# Patient Record
Sex: FEMALE | Race: WHITE | NOT HISPANIC OR LATINO | Employment: OTHER | URBAN - METROPOLITAN AREA
[De-identification: names, ages, dates, MRNs, and addresses within clinical notes are randomized per-mention and may not be internally consistent; named-entity substitution may affect disease eponyms.]

---

## 2024-05-02 ENCOUNTER — APPOINTMENT (OUTPATIENT)
Dept: CARDIOLOGY | Facility: HOSPITAL | Age: 84
End: 2024-05-02
Payer: MEDICARE

## 2024-05-02 ENCOUNTER — APPOINTMENT (OUTPATIENT)
Dept: RADIOLOGY | Facility: HOSPITAL | Age: 84
End: 2024-05-02
Payer: MEDICARE

## 2024-05-02 ENCOUNTER — HOSPITAL ENCOUNTER (OUTPATIENT)
Facility: HOSPITAL | Age: 84
Setting detail: OBSERVATION
Discharge: HOME | End: 2024-05-03
Attending: STUDENT IN AN ORGANIZED HEALTH CARE EDUCATION/TRAINING PROGRAM | Admitting: INTERNAL MEDICINE
Payer: MEDICARE

## 2024-05-02 DIAGNOSIS — R52 PAIN, UNSPECIFIED: ICD-10-CM

## 2024-05-02 DIAGNOSIS — M54.2 CERVICALGIA: ICD-10-CM

## 2024-05-02 DIAGNOSIS — M70.62 TROCHANTERIC BURSITIS, LEFT HIP: ICD-10-CM

## 2024-05-02 DIAGNOSIS — Z96.659 HISTORY OF KNEE JOINT REPLACEMENT: ICD-10-CM

## 2024-05-02 DIAGNOSIS — J45.30 MILD PERSISTENT ASTHMA WITHOUT COMPLICATION (HHS-HCC): ICD-10-CM

## 2024-05-02 DIAGNOSIS — E78.00 PURE HYPERCHOLESTEROLEMIA: ICD-10-CM

## 2024-05-02 DIAGNOSIS — I25.10 ATHEROSCLEROSIS OF NATIVE CORONARY ARTERY OF NATIVE HEART WITHOUT ANGINA PECTORIS: ICD-10-CM

## 2024-05-02 DIAGNOSIS — R23.4 CHANGES IN SKIN TEXTURE: ICD-10-CM

## 2024-05-02 DIAGNOSIS — M94.0 COSTOCHONDRITIS: ICD-10-CM

## 2024-05-02 DIAGNOSIS — N60.11 BILATERAL FIBROCYSTIC BREAST CHANGES: ICD-10-CM

## 2024-05-02 DIAGNOSIS — M76.32 ILIOTIBIAL BAND SYNDROME, LEFT LEG: ICD-10-CM

## 2024-05-02 DIAGNOSIS — M25.552 PAIN IN LEFT HIP: ICD-10-CM

## 2024-05-02 DIAGNOSIS — J45.40 MODERATE PERSISTENT ASTHMA WITHOUT COMPLICATION (HHS-HCC): ICD-10-CM

## 2024-05-02 DIAGNOSIS — M20.5X1 CROSSOVER TOE DEFORMITY OF RIGHT FOOT: ICD-10-CM

## 2024-05-02 DIAGNOSIS — R29.898 WEAKNESS OF BOTH LOWER EXTREMITIES: ICD-10-CM

## 2024-05-02 DIAGNOSIS — I47.10 PSVT (PAROXYSMAL SUPRAVENTRICULAR TACHYCARDIA) (CMS-HCC): ICD-10-CM

## 2024-05-02 DIAGNOSIS — M25.562 CHRONIC PAIN OF LEFT KNEE: ICD-10-CM

## 2024-05-02 DIAGNOSIS — M16.12 PRIMARY OSTEOARTHRITIS OF LEFT HIP: ICD-10-CM

## 2024-05-02 DIAGNOSIS — M20.41 HAMMERTOE OF RIGHT FOOT: ICD-10-CM

## 2024-05-02 DIAGNOSIS — F06.4 ANXIETY DISORDER DUE TO GENERAL MEDICAL CONDITION: ICD-10-CM

## 2024-05-02 DIAGNOSIS — G25.0 BENIGN ESSENTIAL TREMOR: ICD-10-CM

## 2024-05-02 DIAGNOSIS — M20.40 ACQUIRED HAMMER TOE: ICD-10-CM

## 2024-05-02 DIAGNOSIS — M19.079 1ST MTP ARTHRITIS: ICD-10-CM

## 2024-05-02 DIAGNOSIS — I21.4: ICD-10-CM

## 2024-05-02 DIAGNOSIS — R07.9 CHEST PAIN OF UNKNOWN ETIOLOGY: ICD-10-CM

## 2024-05-02 DIAGNOSIS — I77.1 STENOSIS OF LEFT SUBCLAVIAN ARTERY (CMS-HCC): Chronic | ICD-10-CM

## 2024-05-02 DIAGNOSIS — M85.80 OTHER SPECIFIED DISORDERS OF BONE DENSITY AND STRUCTURE, UNSPECIFIED SITE: ICD-10-CM

## 2024-05-02 DIAGNOSIS — M47.816 LUMBAR SPONDYLOSIS: ICD-10-CM

## 2024-05-02 DIAGNOSIS — Z86.718 HISTORY OF DVT OF LOWER EXTREMITY: ICD-10-CM

## 2024-05-02 DIAGNOSIS — N60.12 BILATERAL FIBROCYSTIC BREAST CHANGES: ICD-10-CM

## 2024-05-02 DIAGNOSIS — M25.511 CHRONIC RIGHT SHOULDER PAIN: ICD-10-CM

## 2024-05-02 DIAGNOSIS — I10 HYPERTENSION, UNSPECIFIED TYPE: ICD-10-CM

## 2024-05-02 DIAGNOSIS — E04.1 THYROID NODULE: ICD-10-CM

## 2024-05-02 DIAGNOSIS — B39.4 HISTOPLASMA CAPSULATUM INFECTION: ICD-10-CM

## 2024-05-02 DIAGNOSIS — G45.3 AMAUROSIS FUGAX: ICD-10-CM

## 2024-05-02 DIAGNOSIS — R07.9 ACUTE CHEST PAIN: Primary | ICD-10-CM

## 2024-05-02 DIAGNOSIS — M76.12 PSOAS TENDINITIS, LEFT HIP: ICD-10-CM

## 2024-05-02 DIAGNOSIS — G89.29 CHRONIC PAIN OF LEFT KNEE: ICD-10-CM

## 2024-05-02 DIAGNOSIS — I10 ESSENTIAL HYPERTENSION: ICD-10-CM

## 2024-05-02 DIAGNOSIS — I25.2 OLD MYOCARDIAL INFARCTION: ICD-10-CM

## 2024-05-02 DIAGNOSIS — M51.34 DEGENERATION OF THORACIC INTERVERTEBRAL DISC: ICD-10-CM

## 2024-05-02 DIAGNOSIS — M62.81 GENERALIZED MUSCLE WEAKNESS: ICD-10-CM

## 2024-05-02 DIAGNOSIS — I73.9 PERIPHERAL VASCULAR DISEASE (CMS-HCC): ICD-10-CM

## 2024-05-02 DIAGNOSIS — R07.1 CHEST PAIN ON BREATHING: ICD-10-CM

## 2024-05-02 DIAGNOSIS — M20.61 ACQUIRED DEFORMITY OF RIGHT TOE: ICD-10-CM

## 2024-05-02 DIAGNOSIS — R59.0 REACTIVE CERVICAL LYMPHADENOPATHY: ICD-10-CM

## 2024-05-02 DIAGNOSIS — R73.03 PREDIABETES: ICD-10-CM

## 2024-05-02 DIAGNOSIS — M20.21 HALLUX RIGIDUS, RIGHT FOOT: ICD-10-CM

## 2024-05-02 DIAGNOSIS — M12.811 ROTATOR CUFF ARTHROPATHY OF RIGHT SHOULDER: ICD-10-CM

## 2024-05-02 DIAGNOSIS — M25.561 PAIN, JOINT, KNEE, RIGHT: ICD-10-CM

## 2024-05-02 DIAGNOSIS — Z86.718 PERSONAL HISTORY OF OTHER VENOUS THROMBOSIS AND EMBOLISM: ICD-10-CM

## 2024-05-02 DIAGNOSIS — G89.29 CHRONIC RIGHT SHOULDER PAIN: ICD-10-CM

## 2024-05-02 DIAGNOSIS — R79.89 LFTS ABNORMAL: ICD-10-CM

## 2024-05-02 DIAGNOSIS — H11.32 SUBCONJUNCTIVAL HEMORRHAGE OF LEFT EYE: ICD-10-CM

## 2024-05-02 DIAGNOSIS — Q66.89 CLAW TOE: ICD-10-CM

## 2024-05-02 DIAGNOSIS — Z96.1 PSEUDOPHAKIA OF BOTH EYES: ICD-10-CM

## 2024-05-02 DIAGNOSIS — R91.8 LUNG NODULES: ICD-10-CM

## 2024-05-02 PROBLEM — K21.9 GASTROESOPHAGEAL REFLUX DISEASE: Status: ACTIVE | Noted: 2018-10-19

## 2024-05-02 PROBLEM — S52.136D CLOSED NONDISPLACED FRACTURE OF NECK OF RADIUS WITH ROUTINE HEALING: Status: ACTIVE | Noted: 2021-10-19

## 2024-05-02 PROBLEM — J45.909 ASTHMA (HHS-HCC): Status: ACTIVE | Noted: 2024-05-02

## 2024-05-02 PROBLEM — N60.19 DIFFUSE CYSTIC MASTOPATHY: Status: ACTIVE | Noted: 2024-05-02

## 2024-05-02 PROBLEM — S52.126D CLOSED NONDISPLACED FRACTURE OF HEAD OF RADIUS WITH ROUTINE HEALING: Status: ACTIVE | Noted: 2021-09-08

## 2024-05-02 PROBLEM — E78.5 HYPERLIPIDEMIA: Status: ACTIVE | Noted: 2019-04-29

## 2024-05-02 PROBLEM — J30.9 ALLERGIC RHINITIS: Status: ACTIVE | Noted: 2019-04-29

## 2024-05-02 LAB
ALBUMIN SERPL BCP-MCNC: 3.7 G/DL (ref 3.4–5)
ALP SERPL-CCNC: 49 U/L (ref 33–136)
ALT SERPL W P-5'-P-CCNC: 11 U/L (ref 7–45)
ANION GAP SERPL CALC-SCNC: 11 MMOL/L (ref 10–20)
AORTIC VALVE MEAN GRADIENT: 5 MMHG
AORTIC VALVE PEAK VELOCITY: 1.46 M/S
AST SERPL W P-5'-P-CCNC: 14 U/L (ref 9–39)
AV PEAK GRADIENT: 8.5 MMHG
AVA (PEAK VEL): 1.94 CM2
AVA (VTI): 2.07 CM2
BASOPHILS # BLD AUTO: 0.06 X10*3/UL (ref 0–0.1)
BASOPHILS NFR BLD AUTO: 0.9 %
BILIRUB SERPL-MCNC: 0.5 MG/DL (ref 0–1.2)
BNP SERPL-MCNC: 35 PG/ML (ref 0–99)
BUN SERPL-MCNC: 19 MG/DL (ref 6–23)
CALCIUM SERPL-MCNC: 9.2 MG/DL (ref 8.6–10.3)
CARDIAC TROPONIN I PNL SERPL HS: 3 NG/L (ref 0–13)
CARDIAC TROPONIN I PNL SERPL HS: <3 NG/L (ref 0–13)
CHLORIDE SERPL-SCNC: 108 MMOL/L (ref 98–107)
CO2 SERPL-SCNC: 25 MMOL/L (ref 21–32)
CREAT SERPL-MCNC: 0.64 MG/DL (ref 0.5–1.05)
EGFRCR SERPLBLD CKD-EPI 2021: 88 ML/MIN/1.73M*2
EJECTION FRACTION APICAL 4 CHAMBER: 56.5
EOSINOPHIL # BLD AUTO: 0.23 X10*3/UL (ref 0–0.4)
EOSINOPHIL NFR BLD AUTO: 3.5 %
ERYTHROCYTE [DISTWIDTH] IN BLOOD BY AUTOMATED COUNT: 14.1 % (ref 11.5–14.5)
GLOBAL LONGITUDINAL STRAIN: 14.9 %
GLUCOSE SERPL-MCNC: 111 MG/DL (ref 74–99)
HCT VFR BLD AUTO: 37.4 % (ref 36–46)
HGB BLD-MCNC: 12.1 G/DL (ref 12–16)
IMM GRANULOCYTES # BLD AUTO: 0.01 X10*3/UL (ref 0–0.5)
IMM GRANULOCYTES NFR BLD AUTO: 0.2 % (ref 0–0.9)
LEFT ATRIUM VOLUME AREA LENGTH INDEX BSA: 23.4 ML/M2
LEFT VENTRICLE INTERNAL DIMENSION DIASTOLE: 4 CM (ref 3.5–6)
LEFT VENTRICULAR OUTFLOW TRACT DIAMETER: 2 CM
LIPASE SERPL-CCNC: 32 U/L (ref 9–82)
LV EJECTION FRACTION BIPLANE: 62 %
LYMPHOCYTES # BLD AUTO: 2.62 X10*3/UL (ref 0.8–3)
LYMPHOCYTES NFR BLD AUTO: 39.5 %
MAGNESIUM SERPL-MCNC: 1.69 MG/DL (ref 1.6–2.4)
MCH RBC QN AUTO: 30.9 PG (ref 26–34)
MCHC RBC AUTO-ENTMCNC: 32.4 G/DL (ref 32–36)
MCV RBC AUTO: 96 FL (ref 80–100)
MITRAL VALVE E/A RATIO: 0.8
MITRAL VALVE E/E' RATIO: 12.47
MONOCYTES # BLD AUTO: 0.49 X10*3/UL (ref 0.05–0.8)
MONOCYTES NFR BLD AUTO: 7.4 %
NEUTROPHILS # BLD AUTO: 3.22 X10*3/UL (ref 1.6–5.5)
NEUTROPHILS NFR BLD AUTO: 48.5 %
NRBC BLD-RTO: 0 /100 WBCS (ref 0–0)
PLATELET # BLD AUTO: 202 X10*3/UL (ref 150–450)
POTASSIUM SERPL-SCNC: 4.1 MMOL/L (ref 3.5–5.3)
PROT SERPL-MCNC: 6.1 G/DL (ref 6.4–8.2)
RBC # BLD AUTO: 3.91 X10*6/UL (ref 4–5.2)
RIGHT VENTRICLE FREE WALL PEAK S': 12.2 CM/S
RIGHT VENTRICLE PEAK SYSTOLIC PRESSURE: 27.6 MMHG
SODIUM SERPL-SCNC: 140 MMOL/L (ref 136–145)
TRICUSPID ANNULAR PLANE SYSTOLIC EXCURSION: 2.1 CM
WBC # BLD AUTO: 6.6 X10*3/UL (ref 4.4–11.3)

## 2024-05-02 PROCEDURE — 93306 TTE W/DOPPLER COMPLETE: CPT | Performed by: INTERNAL MEDICINE

## 2024-05-02 PROCEDURE — 83735 ASSAY OF MAGNESIUM: CPT | Performed by: STUDENT IN AN ORGANIZED HEALTH CARE EDUCATION/TRAINING PROGRAM

## 2024-05-02 PROCEDURE — 99222 1ST HOSP IP/OBS MODERATE 55: CPT | Performed by: NURSE PRACTITIONER

## 2024-05-02 PROCEDURE — 83880 ASSAY OF NATRIURETIC PEPTIDE: CPT | Performed by: STUDENT IN AN ORGANIZED HEALTH CARE EDUCATION/TRAINING PROGRAM

## 2024-05-02 PROCEDURE — 93306 TTE W/DOPPLER COMPLETE: CPT

## 2024-05-02 PROCEDURE — 80053 COMPREHEN METABOLIC PANEL: CPT | Performed by: STUDENT IN AN ORGANIZED HEALTH CARE EDUCATION/TRAINING PROGRAM

## 2024-05-02 PROCEDURE — 71046 X-RAY EXAM CHEST 2 VIEWS: CPT | Performed by: RADIOLOGY

## 2024-05-02 PROCEDURE — 83690 ASSAY OF LIPASE: CPT | Performed by: STUDENT IN AN ORGANIZED HEALTH CARE EDUCATION/TRAINING PROGRAM

## 2024-05-02 PROCEDURE — 93005 ELECTROCARDIOGRAM TRACING: CPT

## 2024-05-02 PROCEDURE — 2500000004 HC RX 250 GENERAL PHARMACY W/ HCPCS (ALT 636 FOR OP/ED): Performed by: STUDENT IN AN ORGANIZED HEALTH CARE EDUCATION/TRAINING PROGRAM

## 2024-05-02 PROCEDURE — 2500000001 HC RX 250 WO HCPCS SELF ADMINISTERED DRUGS (ALT 637 FOR MEDICARE OP): Performed by: NURSE PRACTITIONER

## 2024-05-02 PROCEDURE — 84484 ASSAY OF TROPONIN QUANT: CPT | Performed by: STUDENT IN AN ORGANIZED HEALTH CARE EDUCATION/TRAINING PROGRAM

## 2024-05-02 PROCEDURE — 99285 EMERGENCY DEPT VISIT HI MDM: CPT | Mod: 25

## 2024-05-02 PROCEDURE — G0378 HOSPITAL OBSERVATION PER HR: HCPCS

## 2024-05-02 PROCEDURE — 2500000001 HC RX 250 WO HCPCS SELF ADMINISTERED DRUGS (ALT 637 FOR MEDICARE OP): Performed by: STUDENT IN AN ORGANIZED HEALTH CARE EDUCATION/TRAINING PROGRAM

## 2024-05-02 PROCEDURE — 36415 COLL VENOUS BLD VENIPUNCTURE: CPT | Performed by: STUDENT IN AN ORGANIZED HEALTH CARE EDUCATION/TRAINING PROGRAM

## 2024-05-02 PROCEDURE — 96374 THER/PROPH/DIAG INJ IV PUSH: CPT

## 2024-05-02 PROCEDURE — 99222 1ST HOSP IP/OBS MODERATE 55: CPT | Performed by: INTERNAL MEDICINE

## 2024-05-02 PROCEDURE — 71046 X-RAY EXAM CHEST 2 VIEWS: CPT

## 2024-05-02 PROCEDURE — 85025 COMPLETE CBC W/AUTO DIFF WBC: CPT | Performed by: STUDENT IN AN ORGANIZED HEALTH CARE EDUCATION/TRAINING PROGRAM

## 2024-05-02 RX ORDER — LOSARTAN POTASSIUM 25 MG/1
25 TABLET ORAL
COMMUNITY
Start: 2024-02-20

## 2024-05-02 RX ORDER — MOMETASONE FUROATE 100 UG/1
1 AEROSOL RESPIRATORY (INHALATION)
COMMUNITY
Start: 2023-05-25

## 2024-05-02 RX ORDER — ALUMINUM HYDROXIDE, MAGNESIUM HYDROXIDE, AND SIMETHICONE 1200; 120; 1200 MG/30ML; MG/30ML; MG/30ML
30 SUSPENSION ORAL ONCE
Status: COMPLETED | OUTPATIENT
Start: 2024-05-02 | End: 2024-05-02

## 2024-05-02 RX ORDER — TRIAMCINOLONE ACETONIDE 1 MG/G
OINTMENT TOPICAL
COMMUNITY
Start: 2022-07-29

## 2024-05-02 RX ORDER — KETOCONAZOLE 20 MG/G
1 CREAM TOPICAL 2 TIMES DAILY
Status: ON HOLD | COMMUNITY
Start: 2023-10-04 | End: 2024-05-02

## 2024-05-02 RX ORDER — ATORVASTATIN CALCIUM 20 MG/1
20 TABLET, FILM COATED ORAL
Status: DISCONTINUED | OUTPATIENT
Start: 2024-05-02 | End: 2024-05-03 | Stop reason: HOSPADM

## 2024-05-02 RX ORDER — ACETAMINOPHEN 325 MG/1
650 TABLET ORAL EVERY 4 HOURS PRN
Status: DISCONTINUED | OUTPATIENT
Start: 2024-05-02 | End: 2024-05-03 | Stop reason: HOSPADM

## 2024-05-02 RX ORDER — FERROUS SULFATE, DRIED 160(50) MG
1 TABLET, EXTENDED RELEASE ORAL DAILY
Status: DISCONTINUED | OUTPATIENT
Start: 2024-05-02 | End: 2024-05-03 | Stop reason: HOSPADM

## 2024-05-02 RX ORDER — ACETAMINOPHEN 650 MG/1
650 SUPPOSITORY RECTAL EVERY 4 HOURS PRN
Status: DISCONTINUED | OUTPATIENT
Start: 2024-05-02 | End: 2024-05-03 | Stop reason: HOSPADM

## 2024-05-02 RX ORDER — REGADENOSON 0.08 MG/ML
0.4 INJECTION, SOLUTION INTRAVENOUS
Status: CANCELLED | OUTPATIENT
Start: 2024-05-02

## 2024-05-02 RX ORDER — MONTELUKAST SODIUM 10 MG/1
10 TABLET ORAL NIGHTLY
Status: DISCONTINUED | OUTPATIENT
Start: 2024-05-02 | End: 2024-05-03 | Stop reason: HOSPADM

## 2024-05-02 RX ORDER — MORPHINE SULFATE 2 MG/ML
2 INJECTION, SOLUTION INTRAMUSCULAR; INTRAVENOUS EVERY 5 MIN PRN
Status: DISCONTINUED | OUTPATIENT
Start: 2024-05-02 | End: 2024-05-03 | Stop reason: HOSPADM

## 2024-05-02 RX ORDER — LIDOCAINE HYDROCHLORIDE 20 MG/ML
15 SOLUTION OROPHARYNGEAL ONCE
Status: COMPLETED | OUTPATIENT
Start: 2024-05-02 | End: 2024-05-02

## 2024-05-02 RX ORDER — FAMOTIDINE 20 MG/1
20 TABLET, FILM COATED ORAL 2 TIMES DAILY
Status: DISCONTINUED | OUTPATIENT
Start: 2024-05-02 | End: 2024-05-03 | Stop reason: HOSPADM

## 2024-05-02 RX ORDER — LANOLIN ALCOHOL/MO/W.PET/CERES
1 CREAM (GRAM) TOPICAL DAILY
COMMUNITY

## 2024-05-02 RX ORDER — POLYETHYLENE GLYCOL 3350 17 G/17G
17 POWDER, FOR SOLUTION ORAL DAILY
Status: DISCONTINUED | OUTPATIENT
Start: 2024-05-02 | End: 2024-05-03 | Stop reason: HOSPADM

## 2024-05-02 RX ORDER — CHOLECALCIFEROL (VITAMIN D3) 25 MCG
TABLET ORAL
COMMUNITY

## 2024-05-02 RX ORDER — AMINOPHYLLINE 25 MG/ML
125 INJECTION, SOLUTION INTRAVENOUS AS NEEDED
Status: CANCELLED | OUTPATIENT
Start: 2024-05-02

## 2024-05-02 RX ORDER — MONTELUKAST SODIUM 10 MG/1
1 TABLET ORAL NIGHTLY
COMMUNITY
Start: 2023-12-06

## 2024-05-02 RX ORDER — HYDROCORTISONE 25 MG/G
CREAM TOPICAL
COMMUNITY
Start: 2023-10-04

## 2024-05-02 RX ORDER — METOPROLOL SUCCINATE 25 MG/1
1 TABLET, EXTENDED RELEASE ORAL DAILY
Status: ON HOLD | COMMUNITY
End: 2024-05-02

## 2024-05-02 RX ORDER — ASPIRIN 81 MG/1
81 TABLET ORAL DAILY
Status: DISCONTINUED | OUTPATIENT
Start: 2024-05-03 | End: 2024-05-03 | Stop reason: HOSPADM

## 2024-05-02 RX ORDER — NITROGLYCERIN 0.4 MG/1
0.4 TABLET SUBLINGUAL EVERY 5 MIN PRN
Status: DISCONTINUED | OUTPATIENT
Start: 2024-05-02 | End: 2024-05-03 | Stop reason: HOSPADM

## 2024-05-02 RX ORDER — ZOLPIDEM TARTRATE 10 MG/1
5 TABLET ORAL DAILY PRN
COMMUNITY
Start: 2022-09-13

## 2024-05-02 RX ORDER — LOSARTAN POTASSIUM 50 MG/1
50 TABLET ORAL DAILY
Status: DISCONTINUED | OUTPATIENT
Start: 2024-05-02 | End: 2024-05-03 | Stop reason: HOSPADM

## 2024-05-02 RX ORDER — CHOLECALCIFEROL (VITAMIN D3) 125 MCG
CAPSULE ORAL
COMMUNITY

## 2024-05-02 RX ORDER — ACETAMINOPHEN AND PHENYLEPHRINE HCL 325; 5 MG/1; MG/1
TABLET ORAL
Status: ON HOLD | COMMUNITY
End: 2024-05-02 | Stop reason: WASHOUT

## 2024-05-02 RX ORDER — ATORVASTATIN CALCIUM 20 MG/1
20 TABLET, FILM COATED ORAL
COMMUNITY
Start: 2023-10-12

## 2024-05-02 RX ORDER — FENTANYL CITRATE 50 UG/ML
50 INJECTION, SOLUTION INTRAMUSCULAR; INTRAVENOUS ONCE
Status: COMPLETED | OUTPATIENT
Start: 2024-05-02 | End: 2024-05-02

## 2024-05-02 RX ORDER — ACETAMINOPHEN 500 MG
1 TABLET ORAL DAILY
COMMUNITY

## 2024-05-02 RX ORDER — ASPIRIN 325 MG
1 TABLET ORAL DAILY
Status: ON HOLD | COMMUNITY
End: 2024-05-02 | Stop reason: WASHOUT

## 2024-05-02 RX ORDER — ACETAMINOPHEN 160 MG/5ML
650 SOLUTION ORAL EVERY 4 HOURS PRN
Status: DISCONTINUED | OUTPATIENT
Start: 2024-05-02 | End: 2024-05-03 | Stop reason: HOSPADM

## 2024-05-02 RX ORDER — ALBUTEROL SULFATE 0.83 MG/ML
2.5 SOLUTION RESPIRATORY (INHALATION) EVERY 6 HOURS PRN
Status: DISCONTINUED | OUTPATIENT
Start: 2024-05-02 | End: 2024-05-03 | Stop reason: HOSPADM

## 2024-05-02 RX ADMIN — FAMOTIDINE 20 MG: 20 TABLET ORAL at 22:10

## 2024-05-02 RX ADMIN — LIDOCAINE HYDROCHLORIDE 15 ML: 20 SOLUTION ORAL at 09:00

## 2024-05-02 RX ADMIN — FENTANYL CITRATE 50 MCG: 50 INJECTION INTRAMUSCULAR; INTRAVENOUS at 10:34

## 2024-05-02 RX ADMIN — APIXABAN 2.5 MG: 2.5 TABLET, FILM COATED ORAL at 22:10

## 2024-05-02 RX ADMIN — MONTELUKAST 10 MG: 10 TABLET, FILM COATED ORAL at 22:10

## 2024-05-02 RX ADMIN — ALUMINUM HYDROXIDE, MAGNESIUM HYDROXIDE, AND DIMETHICONE 30 ML: 200; 20; 200 SUSPENSION ORAL at 09:00

## 2024-05-02 RX ADMIN — ACETAMINOPHEN 650 MG: 325 TABLET ORAL at 15:48

## 2024-05-02 RX ADMIN — LOSARTAN POTASSIUM 50 MG: 50 TABLET, FILM COATED ORAL at 14:41

## 2024-05-02 RX ADMIN — Medication 1 TABLET: at 22:10

## 2024-05-02 RX ADMIN — ATORVASTATIN CALCIUM 20 MG: 20 TABLET, FILM COATED ORAL at 22:10

## 2024-05-02 SDOH — SOCIAL STABILITY: SOCIAL INSECURITY: HAVE YOU HAD THOUGHTS OF HARMING ANYONE ELSE?: NO

## 2024-05-02 SDOH — SOCIAL STABILITY: SOCIAL INSECURITY: WERE YOU ABLE TO COMPLETE ALL THE BEHAVIORAL HEALTH SCREENINGS?: YES

## 2024-05-02 ASSESSMENT — COGNITIVE AND FUNCTIONAL STATUS - GENERAL
PATIENT BASELINE BEDBOUND: NO
DAILY ACTIVITIY SCORE: 24
MOBILITY SCORE: 24
MOBILITY SCORE: 24
DAILY ACTIVITIY SCORE: 24

## 2024-05-02 ASSESSMENT — LIFESTYLE VARIABLES
SKIP TO QUESTIONS 9-10: 1
AUDIT TOTAL SCORE: 3
HOW OFTEN DURING THE LAST YEAR HAVE YOU BEEN UNABLE TO REMEMBER WHAT HAPPENED THE NIGHT BEFORE BECAUSE YOU HAD BEEN DRINKING: NEVER
AUDIT TOTAL SCORE: 0
HAVE PEOPLE ANNOYED YOU BY CRITICIZING YOUR DRINKING: NO
HOW OFTEN DURING THE LAST YEAR HAVE YOU FOUND THAT YOU WERE NOT ABLE TO STOP DRINKING ONCE YOU HAD STARTED: NEVER
SUBSTANCE_ABUSE_PAST_12_MONTHS: NO
HOW OFTEN DO YOU HAVE A DRINK CONTAINING ALCOHOL: 2-3 TIMES A WEEK
EVER FELT BAD OR GUILTY ABOUT YOUR DRINKING: NO
HOW OFTEN DURING THE LAST YEAR HAVE YOU NEEDED AN ALCOHOLIC DRINK FIRST THING IN THE MORNING TO GET YOURSELF GOING AFTER A NIGHT OF HEAVY DRINKING: NEVER
HAVE YOU EVER FELT YOU SHOULD CUT DOWN ON YOUR DRINKING: NO
EVER HAD A DRINK FIRST THING IN THE MORNING TO STEADY YOUR NERVES TO GET RID OF A HANGOVER: NO
TOTAL SCORE: 0
HOW OFTEN DURING THE LAST YEAR HAVE YOU HAD A FEELING OF GUILT OR REMORSE AFTER DRINKING: NEVER
AUDIT-C TOTAL SCORE: 3
HOW OFTEN DO YOU HAVE 6 OR MORE DRINKS ON ONE OCCASION: NEVER
HOW OFTEN DURING THE LAST YEAR HAVE YOU FAILED TO DO WHAT WAS NORMALLY EXPECTED FROM YOU BECAUSE OF DRINKING: NEVER
PRESCIPTION_ABUSE_PAST_12_MONTHS: NO
HOW MANY STANDARD DRINKS CONTAINING ALCOHOL DO YOU HAVE ON A TYPICAL DAY: 1 OR 2
HAS A RELATIVE, FRIEND, DOCTOR, OR ANOTHER HEALTH PROFESSIONAL EXPRESSED CONCERN ABOUT YOUR DRINKING OR SUGGESTED YOU CUT DOWN: NO
HAVE YOU OR SOMEONE ELSE BEEN INJURED AS A RESULT OF YOUR DRINKING: NO
AUDIT-C TOTAL SCORE: 3

## 2024-05-02 ASSESSMENT — ACTIVITIES OF DAILY LIVING (ADL)
HEARING - RIGHT EAR: FUNCTIONAL
TOILETING: INDEPENDENT
HEARING - RIGHT EAR: FUNCTIONAL
ADEQUATE_TO_COMPLETE_ADL: YES
FEEDING YOURSELF: INDEPENDENT
TOILETING: INDEPENDENT
GROOMING: INDEPENDENT
BATHING: INDEPENDENT
BATHING: INDEPENDENT
JUDGMENT_ADEQUATE_SAFELY_COMPLETE_DAILY_ACTIVITIES: YES
PATIENT'S MEMORY ADEQUATE TO SAFELY COMPLETE DAILY ACTIVITIES?: YES
WALKS IN HOME: INDEPENDENT
DRESSING YOURSELF: INDEPENDENT
JUDGMENT_ADEQUATE_SAFELY_COMPLETE_DAILY_ACTIVITIES: YES
GROOMING: INDEPENDENT
WALKS IN HOME: INDEPENDENT
LACK_OF_TRANSPORTATION: NO
PATIENT'S MEMORY ADEQUATE TO SAFELY COMPLETE DAILY ACTIVITIES?: YES
HEARING - LEFT EAR: FUNCTIONAL
HEARING - LEFT EAR: FUNCTIONAL
FEEDING YOURSELF: INDEPENDENT
ADEQUATE_TO_COMPLETE_ADL: YES
DRESSING YOURSELF: INDEPENDENT

## 2024-05-02 ASSESSMENT — ENCOUNTER SYMPTOMS
SLEEP DISTURBANCE: 0
ADENOPATHY: 0
HEADACHES: 0
UNEXPECTED WEIGHT CHANGE: 0
HEMATURIA: 0
ABDOMINAL PAIN: 0
EYE DISCHARGE: 0
FEVER: 0
FREQUENCY: 0
VOMITING: 0
CHOKING: 0
NUMBNESS: 0
HALLUCINATIONS: 0
CONFUSION: 0
ARTHRALGIAS: 0
NERVOUS/ANXIOUS: 0
NECK STIFFNESS: 0
BLOOD IN STOOL: 0
NAUSEA: 0
SINUS PAIN: 0
COLOR CHANGE: 0
CONSTIPATION: 0
DYSURIA: 0
VOICE CHANGE: 0
TREMORS: 0
CHILLS: 0
DIFFICULTY URINATING: 0
TROUBLE SWALLOWING: 0
EYE ITCHING: 0
POLYDIPSIA: 0
POLYPHAGIA: 0
WHEEZING: 1
SHORTNESS OF BREATH: 0
APPETITE CHANGE: 0
MYALGIAS: 0
BACK PAIN: 0
DIZZINESS: 0
SPEECH DIFFICULTY: 0
APNEA: 0
WOUND: 0
SEIZURES: 0
BRUISES/BLEEDS EASILY: 0
PALPITATIONS: 0
DIARRHEA: 0
DYSPHORIC MOOD: 0
ABDOMINAL DISTENTION: 0
SORE THROAT: 0
FATIGUE: 0
COUGH: 0
LIGHT-HEADEDNESS: 0
CHEST TIGHTNESS: 0
NECK PAIN: 0
WEAKNESS: 0
FLANK PAIN: 0
EYE PAIN: 0
PHOTOPHOBIA: 0

## 2024-05-02 ASSESSMENT — COLUMBIA-SUICIDE SEVERITY RATING SCALE - C-SSRS
1. IN THE PAST MONTH, HAVE YOU WISHED YOU WERE DEAD OR WISHED YOU COULD GO TO SLEEP AND NOT WAKE UP?: NO
2. HAVE YOU ACTUALLY HAD ANY THOUGHTS OF KILLING YOURSELF?: NO
6. HAVE YOU EVER DONE ANYTHING, STARTED TO DO ANYTHING, OR PREPARED TO DO ANYTHING TO END YOUR LIFE?: NO

## 2024-05-02 ASSESSMENT — PAIN SCALES - GENERAL
PAINLEVEL_OUTOF10: 3
PAINLEVEL_OUTOF10: 1
PAINLEVEL_OUTOF10: 6
PAINLEVEL_OUTOF10: 1
PAINLEVEL_OUTOF10: 6

## 2024-05-02 ASSESSMENT — PAIN - FUNCTIONAL ASSESSMENT
PAIN_FUNCTIONAL_ASSESSMENT: 0-10

## 2024-05-02 ASSESSMENT — PATIENT HEALTH QUESTIONNAIRE - PHQ9
SUM OF ALL RESPONSES TO PHQ9 QUESTIONS 1 & 2: 0
1. LITTLE INTEREST OR PLEASURE IN DOING THINGS: NOT AT ALL
2. FEELING DOWN, DEPRESSED OR HOPELESS: NOT AT ALL

## 2024-05-02 ASSESSMENT — PAIN DESCRIPTION - DESCRIPTORS: DESCRIPTORS: ACHING

## 2024-05-02 ASSESSMENT — PAIN DESCRIPTION - LOCATION
LOCATION: HEAD
LOCATION: CHEST

## 2024-05-02 NOTE — H&P
History Obtained From: Patient and chart    History Of Present Illness:  Marychuy Anderson is a 83 y.o. female with PMHx s/f hypertension dyslipidemia multiple DVTs remote history of non-STEMI presenting with pain.  Patient had episode of midsternal chest discomfort after waking up as she did some push-ups she felt that the pain and discomfort improved.  The pain she has does not radiate it is in the center of the chest.  No associated shortness of breath no nausea or diaphoresis.  Today the patient woke up and had worse chest pain she tried her push-ups and the pain did not improve she took multiple aspirin and called EMS EMS initially gave her nitroglycerin which did improve the discomfort but bottomed out her blood pressure.  The patient was still having some chest discomfort on arrival to the emergency department was given 324 mg of aspirin 2 sets of troponins were negative.  EKG did not demonstrate any ischemic changes.  Patient was given fentanyl with improvement in her pain.  Patient denies fevers chills admits to some cough occasional wheezing relates a history of asthma for that.  She has not had any nausea belching vomiting diarrhea melena.  She does relate a history of GERD and has been in the hospital for chest discomfort in the emergency department she was given a GI cocktail that did not improve her symptoms.  Patient is admitted for further cardiac workup and management  ED Course:  ED Course as of 05/02/24 1301   Thu May 02, 2024   0743 EKG on my read shows sinus rhythm at a rate of 68 bpm no ST change elevation nonischemic EKG normal intervals. [CF]   0937 IMPRESSION:  No acute cardiopulmonary process   [CF]   1124 Upon repeat evaluation patient is no longer having chest pain. [CF]      ED Course User Index  [CF] Laine Jaimes MD         Diagnoses as of 05/02/24 1301   Acute chest pain     Relevant Results  Results for orders placed or performed during the hospital encounter of 05/02/24 (from  the past 24 hour(s))   ECG 12 lead   Result Value Ref Range    Ventricular Rate 68 BPM    Atrial Rate 66 BPM    KS Interval 187 ms    QRS Duration 92 ms    QT Interval 392 ms    QTC Calculation(Bazett) 417 ms    P Axis 45 degrees    R Axis 26 degrees    T Axis 15 degrees    QRS Count 11 beats    Q Onset 253 ms    T Offset 449 ms    QTC Fredericia 408 ms   CBC and Auto Differential   Result Value Ref Range    WBC 6.6 4.4 - 11.3 x10*3/uL    nRBC 0.0 0.0 - 0.0 /100 WBCs    RBC 3.91 (L) 4.00 - 5.20 x10*6/uL    Hemoglobin 12.1 12.0 - 16.0 g/dL    Hematocrit 37.4 36.0 - 46.0 %    MCV 96 80 - 100 fL    MCH 30.9 26.0 - 34.0 pg    MCHC 32.4 32.0 - 36.0 g/dL    RDW 14.1 11.5 - 14.5 %    Platelets 202 150 - 450 x10*3/uL    Neutrophils % 48.5 40.0 - 80.0 %    Immature Granulocytes %, Automated 0.2 0.0 - 0.9 %    Lymphocytes % 39.5 13.0 - 44.0 %    Monocytes % 7.4 2.0 - 10.0 %    Eosinophils % 3.5 0.0 - 6.0 %    Basophils % 0.9 0.0 - 2.0 %    Neutrophils Absolute 3.22 1.60 - 5.50 x10*3/uL    Immature Granulocytes Absolute, Automated 0.01 0.00 - 0.50 x10*3/uL    Lymphocytes Absolute 2.62 0.80 - 3.00 x10*3/uL    Monocytes Absolute 0.49 0.05 - 0.80 x10*3/uL    Eosinophils Absolute 0.23 0.00 - 0.40 x10*3/uL    Basophils Absolute 0.06 0.00 - 0.10 x10*3/uL   Magnesium   Result Value Ref Range    Magnesium 1.69 1.60 - 2.40 mg/dL   Comprehensive metabolic panel   Result Value Ref Range    Glucose 111 (H) 74 - 99 mg/dL    Sodium 140 136 - 145 mmol/L    Potassium 4.1 3.5 - 5.3 mmol/L    Chloride 108 (H) 98 - 107 mmol/L    Bicarbonate 25 21 - 32 mmol/L    Anion Gap 11 10 - 20 mmol/L    Urea Nitrogen 19 6 - 23 mg/dL    Creatinine 0.64 0.50 - 1.05 mg/dL    eGFR 88 >60 mL/min/1.73m*2    Calcium 9.2 8.6 - 10.3 mg/dL    Albumin 3.7 3.4 - 5.0 g/dL    Alkaline Phosphatase 49 33 - 136 U/L    Total Protein 6.1 (L) 6.4 - 8.2 g/dL    AST 14 9 - 39 U/L    Bilirubin, Total 0.5 0.0 - 1.2 mg/dL    ALT 11 7 - 45 U/L   Lipase   Result Value Ref Range     Lipase 32 9 - 82 U/L   B-Type Natriuretic Peptide   Result Value Ref Range    BNP 35 0 - 99 pg/mL   Troponin I, High Sensitivity, Initial   Result Value Ref Range    Troponin I, High Sensitivity 3 0 - 13 ng/L   Troponin, High Sensitivity, 1 Hour   Result Value Ref Range    Troponin I, High Sensitivity <3 0 - 13 ng/L      XR chest 2 views    Result Date: 2024  Interpreted By:  Audrey Moody, STUDY: XR CHEST 2 VIEWS;  2024 8:15 am   INDICATION: Signs/Symptoms:CP.   COMPARISON: 12/10/2019   ACCESSION NUMBER(S): [id number]   ORDERING CLINICIAN: ALEXSANDRA CALVERT   FINDINGS: CARDIOMEDIASTINAL SILHOUETTE: Cardiomediastinal silhouette is normal in size and configuration.     LUNGS: Lungs are clear.   ABDOMEN: No remarkable upper abdominal findings.     BONES: No acute osseous changes.       No acute cardiopulmonary process.   MACRO: None   Signed by: Audrey Moody 2024 8:36 AM Dictation workstation:   [id number]    ECG 12 lead    Result Date: 2024  Sinus rhythm Probable anteroseptal infarct, old    OCT MACULA CIRRUS OU (BOTH EYES)    Result Date: 4/10/2024  Date of Procedure 4/10/2024. Technician Information Imaging Technician: Domonique. Start time: 2:33 PM. Stop time: 2:36 PM. Interpretation Right Eye Normal foveal contour. Findings include RPE Irregularity. Left Eye Normal foveal contour. Findings include RPE Irregularity. Interval Change Right Eye Initial. Left Eye Initial.      Scheduled medications:    Continuous medications:    PRN medications:        Past Medical History  History of non-STEMI, mild coronary artery disease by cath , hypertension, PSVT, asthma multiple DVTs, hyperlipidemia GERD subclavian artery stenosis    Surgical History  Appendectomy arthroscopic knee surgery cholecystectomy IVC filter placement cataract surgery      Social History  She has no history on file for tobacco use, alcohol use, and drug use.    Family History  No family history on file.      Allergies  Adhesive, Adhesive tape-silicones, Carbomer, Cheese, Clindamycin, Crab, Doxycycline, House dust, Levofloxacin, Nitrofurantoin, Shellfish containing products, Ace inhibitors, Amoxicillin-pot clavulanate, Beta-blockers (beta-adrenergic blocking agts), Heparin, Heparin (bovine), Nickel, Nitrofurantoin macrocrystal, Sulfa (sulfonamide antibiotics), and Sulfamethoxazole-trimethoprim    Code Status  No Order     Review of Systems   Constitutional:  Negative for appetite change, chills, fatigue, fever and unexpected weight change.   HENT:  Negative for congestion, ear discharge, ear pain, mouth sores, nosebleeds, sinus pain, sore throat, trouble swallowing and voice change.    Eyes:  Negative for photophobia, pain, discharge, itching and visual disturbance.   Respiratory:  Positive for wheezing. Negative for apnea, cough, choking, chest tightness and shortness of breath.    Cardiovascular:  Positive for chest pain. Negative for palpitations and leg swelling.   Gastrointestinal:  Negative for abdominal distention, abdominal pain, blood in stool, constipation, diarrhea, nausea and vomiting.   Endocrine: Negative for cold intolerance, heat intolerance, polydipsia, polyphagia and polyuria.   Genitourinary:  Negative for decreased urine volume, difficulty urinating, dysuria, flank pain, frequency, hematuria and urgency.   Musculoskeletal:  Negative for arthralgias, back pain, gait problem, myalgias, neck pain and neck stiffness.   Skin:  Negative for color change, pallor and wound.   Allergic/Immunologic: Negative for food allergies and immunocompromised state.   Neurological:  Negative for dizziness, tremors, seizures, syncope, speech difficulty, weakness, light-headedness, numbness and headaches.   Hematological:  Negative for adenopathy. Does not bruise/bleed easily.   Psychiatric/Behavioral:  Negative for confusion, dysphoric mood, hallucinations, sleep disturbance and suicidal ideas. The patient is not  nervous/anxious.        Last Recorded Vitals  BP (!) 158/105   Pulse 67   Temp 36.4 °C (97.6 °F)   Resp 17   Wt 76.2 kg (168 lb)   SpO2 95%      Physical Exam  Vitals reviewed.   Constitutional:       General: She is not in acute distress.  HENT:      Head: Normocephalic and atraumatic.      Right Ear: External ear normal.      Left Ear: External ear normal.      Nose: Nose normal.      Mouth/Throat:      Mouth: Mucous membranes are moist.      Pharynx: Oropharynx is clear.   Eyes:      General: No scleral icterus.     Extraocular Movements: Extraocular movements intact.      Conjunctiva/sclera: Conjunctivae normal.      Pupils: Pupils are equal, round, and reactive to light.   Cardiovascular:      Rate and Rhythm: Normal rate and regular rhythm.      Pulses: Normal pulses.      Heart sounds: Normal heart sounds. No murmur heard.  Pulmonary:      Effort: Pulmonary effort is normal. No respiratory distress.      Breath sounds: Normal breath sounds. No wheezing, rhonchi or rales.   Chest:      Chest wall: No tenderness.   Abdominal:      General: Bowel sounds are normal. There is no distension.      Palpations: Abdomen is soft. There is no mass.      Tenderness: There is no abdominal tenderness. There is no rebound.   Musculoskeletal:         General: No swelling or deformity. Normal range of motion.      Cervical back: Normal range of motion.   Skin:     General: Skin is warm and dry.      Capillary Refill: Capillary refill takes less than 2 seconds.   Neurological:      General: No focal deficit present.      Mental Status: She is alert and oriented to person, place, and time.   Psychiatric:         Mood and Affect: Mood normal.         Behavior: Behavior normal.         Assessment/Plan   Principal Problem:    Chest pain on breathing  Active Problems:    Hyperlipidemia    Acid reflux    Essential hypertension    Peripheral vascular disease (CMS-HCC)    Asthma ([facility]-H[facility])    Acute chest pain  Initial evaluation in  the emergency department showed negative cardiac enzymes no EKG changes suggestive of ischemia  Patient reports prior history of non-STEMI but at that time her pain was radiating to the left arm and jaw, subsequent to that event she had cardiac catheterization showing mild coronary disease  She has not had any recent cardiac testing but did recently see her cardiologist earlier this month  Will place on floor with telemetry check an additional set of cardiac enzymes  Request echocardiogram and cardiology consultation  The patient is continued on her Eliquis and statin medication  will add asa previously on beta blocker but did not tolerate    Hypertension  The patient's blood pressure is running slightly elevated in the context of her discomfort  We will reconcile her home medications  Increase losartan  Patient has history of ACE inhibitor reaction with swelling of her lips    Acid reflux disease  H2 blocker    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  Service: Internal Medicine          Ric Haynes, APRN-CNP    Dragon dictation software was used to dictate this note and thus there may be minor errors in translation/transcription including garbled speech or misspellings. Please contact for clarification if needed.

## 2024-05-02 NOTE — ED TRIAGE NOTES
Pt to ed by ems for mid sternal cp that originally started yesterday morning, states pain went away throughout the day but was woken up again this morning with same pain. Cp was gone when EMS arrived but stated it came back en route, was given SL nitroglycerin but pt's b/p dropped to 90's systolic.

## 2024-05-02 NOTE — ED PROVIDER NOTES
HPI   No chief complaint on file.      Patient states that she woke up with chest pain.  She took 3 baby aspirin.  She called EMS since her pain not go away EMS gave her nitro which she states always helped her blood pressure and she felt that she was going to blackout when she took the nitro but she thinks it may have improved her pain.  Patient states that this happened yesterday morning as well and she did push-ups which she does every morning and they went away so she thought it was just her sleeping and awkward position.  After her push-ups today they did not go away so she called an ambulance.  She has had multiple blood clots in the past she is on Eliquis and she reports that she has not missed any doses.  Her chest pain is a pressure in the center of her chest does not go anywhere and is not improved with movement.  She denies any worsening shortness of breath nausea, vomiting or any other symptoms.                          Fidel Coma Scale Score: 15                  Patient History   No past medical history on file.  No past surgical history on file.  No family history on file.  Social History     Tobacco Use    Smoking status: Not on file    Smokeless tobacco: Not on file   Substance Use Topics    Alcohol use: Not on file    Drug use: Not on file       Physical Exam   ED Triage Vitals [05/02/24 0741]   Temperature Heart Rate Respirations BP   36.4 °C (97.6 °F) 70 18 (!) 146/102      Pulse Ox Temp src Heart Rate Source Patient Position   95 % -- Monitor Lying      BP Location FiO2 (%)     -- --       Physical Exam  Constitutional:       General: She is not in acute distress.  HENT:      Head: Normocephalic and atraumatic.      Right Ear: Tympanic membrane normal.      Left Ear: Tympanic membrane normal.      Mouth/Throat:      Mouth: Mucous membranes are moist.   Eyes:      Extraocular Movements: Extraocular movements intact.      Conjunctiva/sclera: Conjunctivae normal.      Pupils: Pupils are equal,  round, and reactive to light.   Cardiovascular:      Rate and Rhythm: Normal rate and regular rhythm.      Heart sounds: No murmur heard.  Pulmonary:      Effort: Pulmonary effort is normal. No respiratory distress.      Breath sounds: Normal breath sounds. No stridor. No wheezing or rales.   Chest:          Comments: Reproducible pain on palpation.  Abdominal:      General: Bowel sounds are normal. There is no distension.      Tenderness: There is no abdominal tenderness. There is no guarding or rebound.   Musculoskeletal:         General: No swelling, tenderness or deformity. Normal range of motion.   Skin:     General: Skin is warm and dry.      Coloration: Skin is not jaundiced.      Findings: No bruising or lesion.   Neurological:      General: No focal deficit present.      Mental Status: She is alert and oriented to person, place, and time. Mental status is at baseline.      Cranial Nerves: No cranial nerve deficit.      Motor: No weakness.   Psychiatric:         Mood and Affect: Mood normal.       Labs Reviewed - No data to display  No orders to display       ED Course & MDM   ED Course as of 05/02/24 1232   u May 02, 2024   0743 EKG on my read shows sinus rhythm at a rate of 68 bpm no ST change elevation nonischemic EKG normal intervals. [CF]   0937 IMPRESSION:  No acute cardiopulmonary process   [CF]   1124 Upon repeat evaluation patient is no longer having chest pain. [CF]      ED Course User Index  [CF] Laine Jaimes MD         Diagnoses as of 05/02/24 1232   Acute chest pain       Medical Decision Making  83-year-old female past medical history of coronary artery disease, DVT/PE on Eliquis, hypertension, anemia presents emerged department for chest pain.  Symptoms did improve with nitro.  She did get a total of 324 mg of aspirin prior to her arrival here.  Patient was continued to have pain in the middle of her chest and was given fentanyl and is now chest pain-free.  Her EKG is nonischemic and  her troponins are negative.  She has not been tachycardic and does not have any shortness of breath I have low suspicion for PE especially with patient not having missed any doses of her Eliquis.  Chest x-ray shows no signs of pneumonia or pneumothorax.  Her pain is reproducible on exam but given her heart score of 4 I spoke with patient regards to admission for observation versus going home and at this time she would like to be admitted.        Procedure  Procedures     Laine Jaimes MD  05/02/24 8010

## 2024-05-02 NOTE — CONSULTS
[facility]    Patient Name: Marychuy Anderson  Patient : 1940  Room/Bed: 2331/2331-A    Date: 24  Time: 2:37 PM    Referred by Dr. Weathers ref. provider found for Chest Pain     History Of Present Illness:    Marychuy Anderson is a 83 y.o. female who presented to the emergency department with chest discomfort.  Patient states that she had chest discomfort which started this morning and has been persistent for several hours.  She states that she tried doing some push-ups to help relieve the discomfort but it did not improve her chest pain.  She was given aspirin and her pain continued.  She therefore decided to come to the emergency department for evaluation.  BMP shows a serum sodium of 140, serum potassium of 4.1, serum creatinine 0.64, serum magnesium was 1.69, BNP was 35, troponin was 3 x 2, CBC showed a hemoglobin of 12.1.  Chest x-ray showed no acute cardiopulmonary process.  ECG showed sinus rhythm with possible anteroseptal infarct age undetermined and a heart rate of 68 bpm.  Cardiac catheterization done in 2019 showed mild nonobstructive disease.  During my exam the patient was resting comfortably in bed.    Assessment/Plan:   1.  Chest pain/CAD  The patient has a history of mild nonobstructive coronary disease as seen on cardiac catheterization done in .  She is now presenting with atypical chest discomfort which was worsened on palpation of her chest wall.  Troponins are negative x 2.  ECG showing sinus rhythm with possible anteroseptal infarct age undetermined and a heart rate of 68 beats per minutes.  Will check echocardiogram and stress study for additional evaluation.    2.  Hypertension  Patient has a history of hypertension which appears suboptimally controlled during my examination.  Would restart home antihypertensive medical therapy and adjust as necessary.    3.  Dyslipidemia  Continue statin therapy.    4.  History of PSVT  Stable, patient  denies any significant palpitations.  ECG showing sinus rhythm with a heart rate of 68 bpm.    5.  History of multiple recurrent DVT.  Patient on apixaban for anticoagulation    Past Medical History:  She has no past medical history on file.  Coronary artery disease, hypertension, dyslipidemia, DVT, PSVT    Past Surgical History:  She has no past surgical history on file.  , and knee surgery, cholecystectomy, foot surgery      Social History:  She reports that she has never smoked. She has never used smokeless tobacco. She reports current alcohol use of about 3.0 standard drinks of alcohol per week. She reports that she does not use drugs.    Family History:  No family history on file.  Father with CAD, mother with CAD and DVT     Allergies:  Adhesive, Adhesive tape-silicones, Carbomer, Cheese, Clindamycin, Crab, Doxycycline, House dust, Levofloxacin, Nitrofurantoin, Shellfish containing products, Ace inhibitors, Amoxicillin-pot clavulanate, Beta-blockers (beta-adrenergic blocking agts), Heparin, Heparin (bovine), Nickel, Nitrofurantoin macrocrystal, Sulfa (sulfonamide antibiotics), and Sulfamethoxazole-trimethoprim    Outpatient Medications:  Current Outpatient Medications   Medication Instructions    apixaban (ELIQUIS) 2.5 mg, oral, 2 times daily    atorvastatin (LIPITOR) 20 mg, oral, Daily RT    biotin 5,000 mcg tablet,disintegrating oral    calcium carbonate-vitamin D3 (Caltrate with Vitamin D3) 600 mg-20 mcg (800 unit) tablet 1 tablet, oral, Daily    cholecalciferol (Vitamin D-3) 25 MCG (1000 UT) tablet oral    cyanocobalamin (Vitamin B-12) 1,000 mcg tablet 1 tablet, oral, Daily    hydrocortisone 2.5 % cream Apply to affected area on face twice daily as needed.    losartan (COZAAR) 25 mg, oral, Daily RT    mometasone (Asmanex HFA) 100 mcg/actuation HFA aerosol inhaler 1 puff, inhalation, Daily RT    montelukast (Singulair) 10 mg tablet 1 tablet, oral, Nightly    multivit-min/ferrous fumarate (MULTI  "VITAMIN ORAL) oral, Daily RT    multivitamin with minerals (multivitamin-iron-folic acid) tablet oral    triamcinolone (Kenalog) 0.1 % ointment Apply to affected areas twice daily as needed, up to 21 days per month.  Avoid application to the face, underarms, groin.    zolpidem (AMBIEN) 5 mg, oral, Daily PRN        ROS:  A 14 point review of systems was done and is negative other than as stated in HPI    Vitals:  Vitals:    05/02/24 1130 05/02/24 1245 05/02/24 1315 05/02/24 1402   BP: (!) 158/105  149/79 153/83   BP Location:    Right arm   Patient Position:    Lying   Pulse: 84 67 66 70   Resp: (!) 21 17 17 16   Temp:    36.9 °C (98.5 °F)   TempSrc:    Temporal   SpO2: 95% 95% (!) 93% 94%   Weight:   76.7 kg (169 lb)    Height:   1.651 m (5' 5\")        Physical Exam:     Constitutional: Cooperative, in no acute distress, alert, appears stated age.  Skin: Skin color, texture, turgor normal. No rashes or lesions.  Head: Normocephalic. No masses, lesions, tenderness or abnormalities  Eyes: Extraocular movements are grossly intact.  Mouth and throat: Mucous membranes moist  Neck: Neck supple, no carotid bruits, no JVD  Respiratory: Lungs clear to auscultation, no wheezing or rhonchi, no use of accessory muscles  Chest wall: No scars, normal excursion with respiration, pain with palpation of anterior chest wall.  Cardiovascular: Regular rhythm without murmur  Gastrointestinal: Abdomen soft, nontender. Bowel sounds normal.  Musculoskeletal: Strength equal in upper extremities  Extremities: No pitting edema  Neurologic: Sensation grossly intact, alert and oriented ×3    Intake/Output:   No intake/output data recorded.    Outpatient Medications  No current facility-administered medications on file prior to encounter.     Current Outpatient Medications on File Prior to Encounter   Medication Sig Dispense Refill    apixaban (Eliquis) 2.5 mg tablet Take 1 tablet (2.5 mg) by mouth twice a day.      atorvastatin (Lipitor) 20 mg " tablet Take 1 tablet (20 mg) by mouth once daily.      biotin 5,000 mcg tablet,disintegrating Take by mouth.      calcium carbonate-vitamin D3 (Caltrate with Vitamin D3) 600 mg-20 mcg (800 unit) tablet Take 1 tablet by mouth once daily.      cholecalciferol (Vitamin D-3) 25 MCG (1000 UT) tablet Take by mouth.      cyanocobalamin (Vitamin B-12) 1,000 mcg tablet Take 1 tablet (1,000 mcg) by mouth once daily.      hydrocortisone 2.5 % cream Apply to affected area on face twice daily as needed.      losartan (Cozaar) 25 mg tablet Take 1 tablet (25 mg) by mouth once daily.      mometasone (Asmanex HFA) 100 mcg/actuation HFA aerosol inhaler Inhale 1 puff once daily.      montelukast (Singulair) 10 mg tablet Take 1 tablet (10 mg) by mouth once daily at bedtime.      multivit-min/ferrous fumarate (MULTI VITAMIN ORAL) Take by mouth once daily.      multivitamin with minerals (multivitamin-iron-folic acid) tablet Take by mouth.      triamcinolone (Kenalog) 0.1 % ointment Apply to affected areas twice daily as needed, up to 21 days per month.  Avoid application to the face, underarms, groin.      zolpidem (Ambien) 10 mg tablet Take 0.5 tablets (5 mg) by mouth once daily as needed.      [DISCONTINUED] BIOTIN ORAL Take by mouth once daily.      [DISCONTINUED] ketoconazole (NIZOral) 2 % cream Apply 1 Application topically twice a day.      [DISCONTINUED] aspirin 325 mg tablet Take 1 tablet (325 mg) by mouth once daily.      [DISCONTINUED] biotin 10,000 mcg capsule Take by mouth.      [DISCONTINUED] iron 18 mg tablet 1 tab(s)  once a day      [DISCONTINUED] metoprolol succinate XL (Toprol-XL) 25 mg 24 hr tablet Take 1 tablet (25 mg) by mouth once daily.         Scheduled medications  apixaban, 2.5 mg, oral, BID  [START ON 5/3/2024] aspirin, 81 mg, oral, Daily  atorvastatin, 20 mg, oral, Daily  famotidine, 20 mg, oral, BID  losartan, 50 mg, oral, Daily  montelukast, 10 mg, oral, Nightly  polyethylene glycol, 17 g, oral,  Daily      Continuous medications     PRN medications  PRN medications: acetaminophen **OR** acetaminophen **OR** acetaminophen, albuterol, morphine, nitroglycerin   Medications Prior to Admission   Medication Sig Dispense Refill Last Dose    apixaban (Eliquis) 2.5 mg tablet Take 1 tablet (2.5 mg) by mouth twice a day.   5/1/2024    atorvastatin (Lipitor) 20 mg tablet Take 1 tablet (20 mg) by mouth once daily.   5/1/2024    biotin 5,000 mcg tablet,disintegrating Take by mouth.   5/1/2024    calcium carbonate-vitamin D3 (Caltrate with Vitamin D3) 600 mg-20 mcg (800 unit) tablet Take 1 tablet by mouth once daily.   5/1/2024    cholecalciferol (Vitamin D-3) 25 MCG (1000 UT) tablet Take by mouth.   5/1/2024    cyanocobalamin (Vitamin B-12) 1,000 mcg tablet Take 1 tablet (1,000 mcg) by mouth once daily.   5/1/2024    hydrocortisone 2.5 % cream Apply to affected area on face twice daily as needed.   Past Month    losartan (Cozaar) 25 mg tablet Take 1 tablet (25 mg) by mouth once daily.   5/1/2024    mometasone (Asmanex HFA) 100 mcg/actuation HFA aerosol inhaler Inhale 1 puff once daily.   5/1/2024    montelukast (Singulair) 10 mg tablet Take 1 tablet (10 mg) by mouth once daily at bedtime.   5/1/2024    multivit-min/ferrous fumarate (MULTI VITAMIN ORAL) Take by mouth once daily.   Past Week    multivitamin with minerals (multivitamin-iron-folic acid) tablet Take by mouth.   Past Week    triamcinolone (Kenalog) 0.1 % ointment Apply to affected areas twice daily as needed, up to 21 days per month.  Avoid application to the face, underarms, groin.   Past Week    zolpidem (Ambien) 10 mg tablet Take 0.5 tablets (5 mg) by mouth once daily as needed.   Past Week       Recent Labs: (past 2 days)  Recent Results (from the past 48 hour(s))   ECG 12 lead    Collection Time: 05/02/24  7:58 AM   Result Value Ref Range    Ventricular Rate 68 BPM    Atrial Rate 66 BPM    SD Interval 187 ms    QRS Duration 92 ms    QT Interval 392 ms     QTC Calculation(Bazett) 417 ms    P Axis 45 degrees    R Axis 26 degrees    T Axis 15 degrees    QRS Count 11 beats    Q Onset 253 ms    T Offset 449 ms    QTC Fredericia 408 ms   CBC and Auto Differential    Collection Time: 05/02/24  8:00 AM   Result Value Ref Range    WBC 6.6 4.4 - 11.3 x10*3/uL    nRBC 0.0 0.0 - 0.0 /100 WBCs    RBC 3.91 (L) 4.00 - 5.20 x10*6/uL    Hemoglobin 12.1 12.0 - 16.0 g/dL    Hematocrit 37.4 36.0 - 46.0 %    MCV 96 80 - 100 fL    MCH 30.9 26.0 - 34.0 pg    MCHC 32.4 32.0 - 36.0 g/dL    RDW 14.1 11.5 - 14.5 %    Platelets 202 150 - 450 x10*3/uL    Neutrophils % 48.5 40.0 - 80.0 %    Immature Granulocytes %, Automated 0.2 0.0 - 0.9 %    Lymphocytes % 39.5 13.0 - 44.0 %    Monocytes % 7.4 2.0 - 10.0 %    Eosinophils % 3.5 0.0 - 6.0 %    Basophils % 0.9 0.0 - 2.0 %    Neutrophils Absolute 3.22 1.60 - 5.50 x10*3/uL    Immature Granulocytes Absolute, Automated 0.01 0.00 - 0.50 x10*3/uL    Lymphocytes Absolute 2.62 0.80 - 3.00 x10*3/uL    Monocytes Absolute 0.49 0.05 - 0.80 x10*3/uL    Eosinophils Absolute 0.23 0.00 - 0.40 x10*3/uL    Basophils Absolute 0.06 0.00 - 0.10 x10*3/uL   Magnesium    Collection Time: 05/02/24  8:00 AM   Result Value Ref Range    Magnesium 1.69 1.60 - 2.40 mg/dL   Comprehensive metabolic panel    Collection Time: 05/02/24  8:00 AM   Result Value Ref Range    Glucose 111 (H) 74 - 99 mg/dL    Sodium 140 136 - 145 mmol/L    Potassium 4.1 3.5 - 5.3 mmol/L    Chloride 108 (H) 98 - 107 mmol/L    Bicarbonate 25 21 - 32 mmol/L    Anion Gap 11 10 - 20 mmol/L    Urea Nitrogen 19 6 - 23 mg/dL    Creatinine 0.64 0.50 - 1.05 mg/dL    eGFR 88 >60 mL/min/1.73m*2    Calcium 9.2 8.6 - 10.3 mg/dL    Albumin 3.7 3.4 - 5.0 g/dL    Alkaline Phosphatase 49 33 - 136 U/L    Total Protein 6.1 (L) 6.4 - 8.2 g/dL    AST 14 9 - 39 U/L    Bilirubin, Total 0.5 0.0 - 1.2 mg/dL    ALT 11 7 - 45 U/L   Lipase    Collection Time: 05/02/24  8:00 AM   Result Value Ref Range    Lipase 32 9 - 82 U/L    B-Type Natriuretic Peptide    Collection Time: 05/02/24  8:00 AM   Result Value Ref Range    BNP 35 0 - 99 pg/mL   Troponin I, High Sensitivity, Initial    Collection Time: 05/02/24  8:00 AM   Result Value Ref Range    Troponin I, High Sensitivity 3 0 - 13 ng/L   Troponin, High Sensitivity, 1 Hour    Collection Time: 05/02/24  8:55 AM   Result Value Ref Range    Troponin I, High Sensitivity <3 0 - 13 ng/L       CV Studies:  EKG:  Encounter Date: 05/02/24   ECG 12 lead   Result Value    Ventricular Rate 68    Atrial Rate 66    MS Interval 187    QRS Duration 92    QT Interval 392    QTC Calculation(Bazett) 417    P Axis 45    R Axis 26    T Axis 15    QRS Count 11    Q Onset 253    T Offset 449    QTC Fredericia 408    Narrative    Sinus rhythm  Probable anteroseptal infarct, old     Echocardiogram: No results found for this or any previous visit from the past 1825 days.    Stress Testing IMGRESULT(UHC2190:1:1825): No results found for this or any previous visit from the past 1825 days.    Cardiac Catheterization:   Adult Cath     [facility], Cath Lab  [address] [place] [address]  Phone [phone] Fax [phone]    Cardiovascular Catheterization Report    Patient Name:     DESTINI SEVILLA Performing Physician: [id number] Mueller MD  Study Date:       12/10/2019      Verifying Physician:  Catrina Mueller MD  MRN/PID:          [id number]        Cardiologist:  Accession/Order#: [id number]       Referring Physician:  xxx  YOB: 1940       Referring Physician:  Gender:           F               Referring Physician:      Study: Left Heart Catheterization      Indications:  DESTINI SEVILLA is a 79 year old female who presents with hypertension and dyslipidemia. Worsening angina, with a chest pain assessment of typical angina. Study performed as an urgent cath procedure.    Medical History:  Stress test performed: No. CTA performed: Maged Sanchez accessed: No. LVEF  Assessed: Yes.    Procedure Description:  After infiltration with 2% Lidocaine, the right femoral artery was cannulated with a modified Seldinger technique. Subsequently a 6 Serbian sheath was placed in the right femoral artery. Selective coronary catheterization was performed using a 6 Fr catheter(s) exchanged over a guide wire to cannulate the coronary arteries. A JL 4 tip catheter was used for left coronary injections. A JR 4 tip catheter was used for right coronary injections.  Multiple injections of contrast were made into the left and right coronary arteries with angiograms recorded in multiple projections.    Coronary Angiography:  The coronary circulation is right dominant.    Left Main Coronary Artery:  The left main coronary artery is a normal caliber vessel. The left main arises normally from the left coronary sinus of Valsalva and bifurcates into the LAD and circumflex coronary arteries. The left main coronary artery showed no significant disease or stenosis greater than 30%.    Left Anterior Descending Coronary Artery Distribution:  The left anterior descending coronary artery is a normal caliber vessel. The LAD arises normally from the left main coronary artery. The LAD demonstrated mild atherosclerotic disease.    Circumflex Coronary Artery Distribution:  The circumflex coronary artery is a normal caliber vessel. The circumflex arises normally from the left main coronary artery and terminates in the AV groove. The circumflex revealed no significant disease or stenosis greater than 30%.    Right Coronary Artery Distribution:    The right coronary artery is a normal caliber vessel. The RCA arises normally from the right sinus of Valsalva. The RCA showed no significant disease or stenosis greater than 30%.      Left Ventriculography:  The size of the left ventricle is normal. Global left ventricular systolic function was normal. The LV ejection fraction was 50 to 55%. All left ventricular regional wall  segments contract normally.      Valve Findings:  No aortic valve stenosis is visualized.    Hemo Personnel:  +---------------------+-------------+  Name                 Duty           +---------------------+-------------+  Bossman Mueller MD          PROC MD 1  +---------------------+-------------+  Irma Soares RTR  PROC SCRUB 1  +---------------------+-------------+  Gagandeep Hood RN  PROC CIRC 1  +---------------------+-------------+  Joyce Yadav RN    PROC RECORD 1  +---------------------+-------------+  Sandy Ballesteros RN    PROC NURSE 1  +---------------------+-------------+  Cecy Brandt RN      PROC NURSE 2  +---------------------+-------------+      Sedation Time:  +------------------------+----------------------------+  Sedation Start/End TimesTime                          +------------------------+----------------------------+  Start                   12/10/2019 12:19:19           +------------------------+----------------------------+  Drugs                   Versed 1 mg IV per Physician  +------------------------+----------------------------+  End                     12/10/2019 12:34:53           +------------------------+----------------------------+      Equipment Used:  +----------------------+-------------------------------------------------------+               Date/Time                                            Description  +----------------------+-------------------------------------------------------+  12/10/2019 12:06:55 PM      [employer] Micropuncture Set 4 FR                                                S-Jose Alberto - Qty: 1 Each Part #: 130  +----------------------+-------------------------------------------------------+  12/10/2019 12:06:58 PM    - Capnoflex LF Oral/Nasal CO2 - Qty: 1                                                               Each Part #:  [id number]  +----------------------+-------------------------------------------------------+  12/10/2019 12:07:00 PM   Terumo TR Band Regular - Qty: 1 Each                                                                  Part #: [id number]  +----------------------+-------------------------------------------------------+  12/10/2019 12:07:29 PM     Medtronic DXTERITY JL 4.0 6FR X                                                125CM - Qty: 1 Each Part #: [id number]  +----------------------+-------------------------------------------------------+  12/10/2019 12:07:35 PM     Medtronic DXTERITY JR 4.0 6FR X                                                100cm - Qty: 1 Each Part #: [id number]  +----------------------+-------------------------------------------------------+  12/10/2019 12:07:41 PM   [employer] Merit Guidewire 3mm J                                          210 cm .035 - Qty: 1 Each Part #: [id number]  +----------------------+-------------------------------------------------------+  12/10/2019 12:07:55 PM  - [place] Sheath 6 FR x 10cm - Qty:                                                              1 Each Part #: 85  +----------------------+-------------------------------------------------------+      Fluoroscopy Time:  +--------------------------+--------+  X-Ray Summary Fluoro Time:2.15 min  +--------------------------+--------+      +----------+--------+  Contrast: Dose:     +----------+--------+  Omnipaque:70.00 ml  +----------+--------+      Hemodynamic Pressures:    +----+---------------+----------+-------------+--------------+-------+---------+  Site   Date Time   Phase NameSystolic mmHgDiastolic mmHgED mmHgMean mmHg  +----+---------------+----------+-------------+--------------+-------+---------+    AO     12/10/2019  AIR REST          137            66              95          12:26:58 PM                                                        +----+---------------+----------+-------------+--------------+-------+---------+    LV     12/10/2019  AIR REST          138            -1      6                   12:31:18 PM                                                       +----+---------------+----------+-------------+--------------+-------+---------+   LVp     12/10/2019  AIR REST          146             2      8                   12:31:34 PM                                                       +----+---------------+----------+-------------+--------------+-------+---------+   AOp     12/10/2019  AIR REST          150            77             110          12:31:41 PM                                                       +----+---------------+----------+-------------+--------------+-------+---------+        Oxygen Saturation %:  +-----------+------------+  Sample SiteHB (g/100ml)  +-----------+------------+      SYS ART        12.6  +-----------+------------+      SYS ANTHONY        12.6  +-----------+------------+      PUL ART        12.6  +-----------+------------+      PUL ANTHONY        12.6  +-----------+------------+      Complications:  No in-lab complications observed.    Cardiac Cath Transition of Care Summary:  Post Procedure Diagnosis: Mild CAD.  Blood Loss:               Estimated blood loss during the procedure was 0 mls.  Specimens Removed:        Number of specimen(s) removed: none.      Recommendations:  Maximize medical therapy.    ____________________________________________________________________________________  CONCLUSIONS:  1. Mild non-obstructive coronary artery disease.  2. Left Ventricular end-diastolic pressure = 8.  3. Normal LV filling pressures.    ____________________________________________________________________________________  CPT Codes:  Left Heart Cath Coronary angio w/wo ventriculography ([facility])-53704; Moderate Sedation Services initial 15 minutes patient >5  years-65627; Moderate Sedation Services 1st additional 15 minutes patient >5 years-32341    ICD 10 Codes:  I20.0-Unstable angina    [id number] Bossman Mueller MD  Performing Physician  Electronically signed by [id number] Bossman Mueller MD on 12/10/2019 at 8:37:17 PM      cc Report to: xxx           Final   No results found for this or any previous visit from the past 3650 days.     Cardiac Scoring: No results found for this or any previous visit from the past 1825 days.    AAA : No results found for this or any previous visit from the past 1825 days.    OTHER: No results found for this or any previous visit from the past 1825 days.    LAST IMAGING RESULTS  XR chest 2 views  Narrative: Interpreted By:  Audrey Moody,   STUDY:  XR CHEST 2 VIEWS;  5/2/2024 8:15 am      INDICATION:  Signs/Symptoms:CP.      COMPARISON:  12/10/2019      ACCESSION NUMBER(S):  [id number]      ORDERING CLINICIAN:  ALEXSANDRA CALVERT      FINDINGS:  CARDIOMEDIASTINAL SILHOUETTE:  Cardiomediastinal silhouette is normal in size and configuration.          LUNGS:  Lungs are clear.      ABDOMEN:  No remarkable upper abdominal findings.          BONES:  No acute osseous changes.      Impression: No acute cardiopulmonary process.      MACRO:  None      Signed by: Audrey Moody 5/2/2024 8:36 AM  Dictation workstation:   [id number]  ECG 12 lead  Sinus rhythm  Probable anteroseptal infarct, old        Kennedy Freeman DO, FACC, FACOI  5/2/2024  2:37 PM

## 2024-05-03 ENCOUNTER — APPOINTMENT (OUTPATIENT)
Dept: RADIOLOGY | Facility: HOSPITAL | Age: 84
End: 2024-05-03
Payer: MEDICARE

## 2024-05-03 ENCOUNTER — APPOINTMENT (OUTPATIENT)
Dept: CARDIOLOGY | Facility: HOSPITAL | Age: 84
End: 2024-05-03
Payer: MEDICARE

## 2024-05-03 ENCOUNTER — PHARMACY VISIT (OUTPATIENT)
Dept: PHARMACY | Facility: CLINIC | Age: 84
End: 2024-05-03
Payer: MEDICARE

## 2024-05-03 VITALS
DIASTOLIC BLOOD PRESSURE: 90 MMHG | OXYGEN SATURATION: 96 % | SYSTOLIC BLOOD PRESSURE: 140 MMHG | BODY MASS INDEX: 28.16 KG/M2 | WEIGHT: 169 LBS | HEIGHT: 65 IN | HEART RATE: 91 BPM | TEMPERATURE: 98.2 F | RESPIRATION RATE: 19 BRPM

## 2024-05-03 PROBLEM — I10 HTN (HYPERTENSION): Status: ACTIVE | Noted: 2024-05-03

## 2024-05-03 LAB
ANION GAP SERPL CALC-SCNC: 11 MMOL/L (ref 10–20)
BUN SERPL-MCNC: 16 MG/DL (ref 6–23)
CALCIUM SERPL-MCNC: 9.3 MG/DL (ref 8.6–10.3)
CHLORIDE SERPL-SCNC: 109 MMOL/L (ref 98–107)
CHOLEST SERPL-MCNC: 132 MG/DL (ref 0–199)
CHOLESTEROL/HDL RATIO: 2.7
CO2 SERPL-SCNC: 24 MMOL/L (ref 21–32)
CREAT SERPL-MCNC: 0.61 MG/DL (ref 0.5–1.05)
EGFRCR SERPLBLD CKD-EPI 2021: 89 ML/MIN/1.73M*2
ERYTHROCYTE [DISTWIDTH] IN BLOOD BY AUTOMATED COUNT: 13.8 % (ref 11.5–14.5)
GLUCOSE SERPL-MCNC: 96 MG/DL (ref 74–99)
HCT VFR BLD AUTO: 38.5 % (ref 36–46)
HDLC SERPL-MCNC: 48.7 MG/DL
HGB BLD-MCNC: 12.4 G/DL (ref 12–16)
LDLC SERPL CALC-MCNC: 64 MG/DL
MCH RBC QN AUTO: 30.5 PG (ref 26–34)
MCHC RBC AUTO-ENTMCNC: 32.2 G/DL (ref 32–36)
MCV RBC AUTO: 95 FL (ref 80–100)
NON HDL CHOLESTEROL: 83 MG/DL (ref 0–149)
NRBC BLD-RTO: 0 /100 WBCS (ref 0–0)
PLATELET # BLD AUTO: 195 X10*3/UL (ref 150–450)
POTASSIUM SERPL-SCNC: 4 MMOL/L (ref 3.5–5.3)
RBC # BLD AUTO: 4.07 X10*6/UL (ref 4–5.2)
SODIUM SERPL-SCNC: 140 MMOL/L (ref 136–145)
TRIGL SERPL-MCNC: 98 MG/DL (ref 0–149)
VLDL: 20 MG/DL (ref 0–40)
WBC # BLD AUTO: 6.4 X10*3/UL (ref 4.4–11.3)

## 2024-05-03 PROCEDURE — 2500000004 HC RX 250 GENERAL PHARMACY W/ HCPCS (ALT 636 FOR OP/ED): Performed by: INTERNAL MEDICINE

## 2024-05-03 PROCEDURE — 99233 SBSQ HOSP IP/OBS HIGH 50: CPT | Performed by: PHYSICIAN ASSISTANT

## 2024-05-03 PROCEDURE — 99238 HOSP IP/OBS DSCHRG MGMT 30/<: CPT | Performed by: PHYSICIAN ASSISTANT

## 2024-05-03 PROCEDURE — 93005 ELECTROCARDIOGRAM TRACING: CPT

## 2024-05-03 PROCEDURE — A9502 TC99M TETROFOSMIN: HCPCS | Performed by: INTERNAL MEDICINE

## 2024-05-03 PROCEDURE — 99232 SBSQ HOSP IP/OBS MODERATE 35: CPT | Performed by: PHYSICIAN ASSISTANT

## 2024-05-03 PROCEDURE — 80048 BASIC METABOLIC PNL TOTAL CA: CPT | Performed by: NURSE PRACTITIONER

## 2024-05-03 PROCEDURE — RXMED WILLOW AMBULATORY MEDICATION CHARGE

## 2024-05-03 PROCEDURE — G0378 HOSPITAL OBSERVATION PER HR: HCPCS

## 2024-05-03 PROCEDURE — 93017 CV STRESS TEST TRACING ONLY: CPT

## 2024-05-03 PROCEDURE — 78452 HT MUSCLE IMAGE SPECT MULT: CPT

## 2024-05-03 PROCEDURE — 78452 HT MUSCLE IMAGE SPECT MULT: CPT | Performed by: STUDENT IN AN ORGANIZED HEALTH CARE EDUCATION/TRAINING PROGRAM

## 2024-05-03 PROCEDURE — 3430000001 HC RX 343 DIAGNOSTIC RADIOPHARMACEUTICALS: Performed by: INTERNAL MEDICINE

## 2024-05-03 PROCEDURE — 96375 TX/PRO/DX INJ NEW DRUG ADDON: CPT | Mod: 59

## 2024-05-03 PROCEDURE — 36415 COLL VENOUS BLD VENIPUNCTURE: CPT | Performed by: NURSE PRACTITIONER

## 2024-05-03 PROCEDURE — 85027 COMPLETE CBC AUTOMATED: CPT | Performed by: NURSE PRACTITIONER

## 2024-05-03 PROCEDURE — 93010 ELECTROCARDIOGRAM REPORT: CPT | Performed by: INTERNAL MEDICINE

## 2024-05-03 PROCEDURE — 2500000001 HC RX 250 WO HCPCS SELF ADMINISTERED DRUGS (ALT 637 FOR MEDICARE OP): Performed by: STUDENT IN AN ORGANIZED HEALTH CARE EDUCATION/TRAINING PROGRAM

## 2024-05-03 PROCEDURE — 2500000001 HC RX 250 WO HCPCS SELF ADMINISTERED DRUGS (ALT 637 FOR MEDICARE OP): Performed by: NURSE PRACTITIONER

## 2024-05-03 PROCEDURE — 80061 LIPID PANEL: CPT | Performed by: NURSE PRACTITIONER

## 2024-05-03 RX ORDER — AMLODIPINE BESYLATE 5 MG/1
5 TABLET ORAL DAILY
Status: DISCONTINUED | OUTPATIENT
Start: 2024-05-03 | End: 2024-05-03 | Stop reason: HOSPADM

## 2024-05-03 RX ORDER — REGADENOSON 0.08 MG/ML
0.4 INJECTION, SOLUTION INTRAVENOUS ONCE
Status: COMPLETED | OUTPATIENT
Start: 2024-05-03 | End: 2024-05-03

## 2024-05-03 RX ORDER — AMLODIPINE BESYLATE 5 MG/1
5 TABLET ORAL DAILY
Qty: 30 TABLET | Refills: 0 | Status: SHIPPED | OUTPATIENT
Start: 2024-05-04 | End: 2024-06-03

## 2024-05-03 RX ORDER — HYDRALAZINE HYDROCHLORIDE 10 MG/1
10 TABLET, FILM COATED ORAL 3 TIMES DAILY PRN
Status: DISCONTINUED | OUTPATIENT
Start: 2024-05-03 | End: 2024-05-03 | Stop reason: HOSPADM

## 2024-05-03 RX ADMIN — Medication 1 TABLET: at 08:04

## 2024-05-03 RX ADMIN — TETROFOSMIN 10 MILLICURIE: 0.23 INJECTION, POWDER, LYOPHILIZED, FOR SOLUTION INTRAVENOUS at 09:08

## 2024-05-03 RX ADMIN — LOSARTAN POTASSIUM 50 MG: 50 TABLET, FILM COATED ORAL at 08:05

## 2024-05-03 RX ADMIN — TETROFOSMIN 30 MILLICURIE: 0.23 INJECTION, POWDER, LYOPHILIZED, FOR SOLUTION INTRAVENOUS at 10:50

## 2024-05-03 RX ADMIN — ASPIRIN 81 MG: 81 TABLET, COATED ORAL at 08:05

## 2024-05-03 RX ADMIN — AMLODIPINE BESYLATE 5 MG: 5 TABLET ORAL at 08:05

## 2024-05-03 RX ADMIN — APIXABAN 2.5 MG: 2.5 TABLET, FILM COATED ORAL at 08:05

## 2024-05-03 RX ADMIN — FAMOTIDINE 20 MG: 20 TABLET ORAL at 08:05

## 2024-05-03 RX ADMIN — NITROGLYCERIN 0.4 MG: 0.4 TABLET SUBLINGUAL at 00:18

## 2024-05-03 RX ADMIN — REGADENOSON 0.4 MG: 0.08 INJECTION, SOLUTION INTRAVENOUS at 11:15

## 2024-05-03 RX ADMIN — ACETAMINOPHEN 650 MG: 325 TABLET ORAL at 01:59

## 2024-05-03 SDOH — HEALTH STABILITY: PHYSICAL HEALTH: ON AVERAGE, HOW MANY DAYS PER WEEK DO YOU ENGAGE IN MODERATE TO STRENUOUS EXERCISE (LIKE A BRISK WALK)?: 7 DAYS

## 2024-05-03 SDOH — SOCIAL STABILITY: SOCIAL NETWORK
DO YOU BELONG TO ANY CLUBS OR ORGANIZATIONS SUCH AS CHURCH GROUPS UNIONS, FRATERNAL OR ATHLETIC GROUPS, OR SCHOOL GROUPS?: YES

## 2024-05-03 SDOH — SOCIAL STABILITY: SOCIAL INSECURITY
WITHIN THE LAST YEAR, HAVE YOU BEEN KICKED, HIT, SLAPPED, OR OTHERWISE PHYSICALLY HURT BY YOUR PARTNER OR EX-PARTNER?: NO

## 2024-05-03 SDOH — ECONOMIC STABILITY: FOOD INSECURITY: WITHIN THE PAST 12 MONTHS, YOU WORRIED THAT YOUR FOOD WOULD RUN OUT BEFORE YOU GOT MONEY TO BUY MORE.: NEVER TRUE

## 2024-05-03 SDOH — SOCIAL STABILITY: SOCIAL INSECURITY: WITHIN THE LAST YEAR, HAVE YOU BEEN AFRAID OF YOUR PARTNER OR EX-PARTNER?: NO

## 2024-05-03 SDOH — ECONOMIC STABILITY: INCOME INSECURITY: IN THE PAST 12 MONTHS, HAS THE ELECTRIC, GAS, OIL, OR WATER COMPANY THREATENED TO SHUT OFF SERVICE IN YOUR HOME?: NO

## 2024-05-03 SDOH — SOCIAL STABILITY: SOCIAL INSECURITY: WITHIN THE LAST YEAR, HAVE YOU BEEN HUMILIATED OR EMOTIONALLY ABUSED IN OTHER WAYS BY YOUR PARTNER OR EX-PARTNER?: NO

## 2024-05-03 SDOH — HEALTH STABILITY: MENTAL HEALTH
STRESS IS WHEN SOMEONE FEELS TENSE, NERVOUS, ANXIOUS, OR CAN'T SLEEP AT NIGHT BECAUSE THEIR MIND IS TROUBLED. HOW STRESSED ARE YOU?: NOT AT ALL

## 2024-05-03 SDOH — SOCIAL STABILITY: SOCIAL NETWORK: ARE YOU MARRIED, WIDOWED, DIVORCED, SEPARATED, NEVER MARRIED, OR LIVING WITH A PARTNER?: MARRIED

## 2024-05-03 SDOH — ECONOMIC STABILITY: FOOD INSECURITY: WITHIN THE PAST 12 MONTHS, THE FOOD YOU BOUGHT JUST DIDN'T LAST AND YOU DIDN'T HAVE MONEY TO GET MORE.: NEVER TRUE

## 2024-05-03 SDOH — HEALTH STABILITY: MENTAL HEALTH
HOW OFTEN DO YOU NEED TO HAVE SOMEONE HELP YOU WHEN YOU READ INSTRUCTIONS, PAMPHLETS, OR OTHER WRITTEN MATERIAL FROM YOUR DOCTOR OR PHARMACY?: NEVER

## 2024-05-03 SDOH — SOCIAL STABILITY: SOCIAL NETWORK: HOW OFTEN DO YOU ATTENT MEETINGS OF THE CLUB OR ORGANIZATION YOU BELONG TO?: MORE THAN 4 TIMES PER YEAR

## 2024-05-03 SDOH — HEALTH STABILITY: PHYSICAL HEALTH: ON AVERAGE, HOW MANY MINUTES DO YOU ENGAGE IN EXERCISE AT THIS LEVEL?: 10 MIN

## 2024-05-03 SDOH — SOCIAL STABILITY: SOCIAL INSECURITY
WITHIN THE LAST YEAR, HAVE TO BEEN RAPED OR FORCED TO HAVE ANY KIND OF SEXUAL ACTIVITY BY YOUR PARTNER OR EX-PARTNER?: NO

## 2024-05-03 SDOH — SOCIAL STABILITY: SOCIAL NETWORK: HOW OFTEN DO YOU GET TOGETHER WITH FRIENDS OR RELATIVES?: THREE TIMES A WEEK

## 2024-05-03 SDOH — SOCIAL STABILITY: SOCIAL NETWORK: HOW OFTEN DO YOU ATTEND CHURCH OR RELIGIOUS SERVICES?: MORE THAN 4 TIMES PER YEAR

## 2024-05-03 SDOH — SOCIAL STABILITY: SOCIAL NETWORK: IN A TYPICAL WEEK, HOW MANY TIMES DO YOU TALK ON THE PHONE WITH FAMILY, FRIENDS, OR NEIGHBORS?: THREE TIMES A WEEK

## 2024-05-03 ASSESSMENT — ENCOUNTER SYMPTOMS
ORTHOPNEA: 0
FLANK PAIN: 0
FACIAL SWELLING: 0
VOMITING: 0
SORE THROAT: 0
HEADACHES: 0
COUGH: 0
ABDOMINAL PAIN: 0
PALPITATIONS: 0
FREQUENCY: 0
DIARRHEA: 0
EYE PAIN: 0
TROUBLE SWALLOWING: 0
NUMBNESS: 0
HALLUCINATIONS: 0
WEAKNESS: 0
LIGHT-HEADEDNESS: 0
SHORTNESS OF BREATH: 0
HEMATURIA: 0
BRUISES/BLEEDS EASILY: 0
DIZZINESS: 0
WHEEZING: 0
NAUSEA: 0
APPETITE CHANGE: 0
FATIGUE: 0
DIAPHORESIS: 0
FEVER: 0
CONSTIPATION: 0
WOUND: 0
JOINT SWELLING: 0
BLOOD IN STOOL: 0
CHEST TIGHTNESS: 0
CHILLS: 0
BACK PAIN: 0
DYSURIA: 0

## 2024-05-03 ASSESSMENT — PAIN - FUNCTIONAL ASSESSMENT
PAIN_FUNCTIONAL_ASSESSMENT: 0-10
PAIN_FUNCTIONAL_ASSESSMENT: 0-10

## 2024-05-03 ASSESSMENT — COGNITIVE AND FUNCTIONAL STATUS - GENERAL
MOBILITY SCORE: 24
DAILY ACTIVITIY SCORE: 24

## 2024-05-03 ASSESSMENT — PAIN SCALES - GENERAL
PAINLEVEL_OUTOF10: 3
PAINLEVEL_OUTOF10: 3
PAINLEVEL_OUTOF10: 6

## 2024-05-03 ASSESSMENT — PAIN DESCRIPTION - DESCRIPTORS: DESCRIPTORS: SORE

## 2024-05-03 ASSESSMENT — PAIN DESCRIPTION - LOCATION: LOCATION: CHEST

## 2024-05-03 NOTE — PROGRESS NOTES
Marychuy Anderson is a 83 y.o. female on day 0 of admission presenting with Chest pain on breathing.      Subjective   Marychuy Anderson is a 83 y.o. female with PMHx s/f hypertension dyslipidemia multiple DVTs remote history of non-STEMI presenting with pain.  Patient had episode of midsternal chest discomfort after waking up as she did some push-ups she felt that the pain and discomfort improved.  The pain she has does not radiate it is in the center of the chest.  No associated shortness of breath no nausea or diaphoresis.  Today the patient woke up and had worse chest pain she tried her push-ups and the pain did not improve she took multiple aspirin and called EMS EMS initially gave her nitroglycerin which did improve the discomfort but bottomed out her blood pressure.  The patient was still having some chest discomfort on arrival to the emergency department was given 324 mg of aspirin 2 sets of troponins were negative.  EKG did not demonstrate any ischemic changes.  Patient was given fentanyl with improvement in her pain.  Patient denies fevers chills admits to some cough occasional wheezing relates a history of asthma for that.  Echo: EF 60%, no WMA. Lipid panel WNL    5/3/2024: No acute events overnight. Vitals stable, BP slightly accelerated. Labs largely unremarkable. Symptoms seem very atypical for ischemia, suspect more-so MSK but will await stress test.          Review of Systems   Constitutional:  Negative for appetite change, chills, diaphoresis, fatigue and fever.   HENT:  Negative for congestion, ear pain, facial swelling, hearing loss, nosebleeds, sore throat, tinnitus and trouble swallowing.    Eyes:  Negative for pain.   Respiratory:  Negative for cough, chest tightness, shortness of breath and wheezing.    Cardiovascular:  Negative for chest pain, palpitations and leg swelling.   Gastrointestinal:  Negative for abdominal pain, blood in stool, constipation, diarrhea, nausea and vomiting.    Genitourinary:  Negative for dysuria, flank pain, frequency, hematuria and urgency.   Musculoskeletal:  Negative for back pain and joint swelling.   Skin:  Negative for rash and wound.   Neurological:  Negative for dizziness, syncope, weakness, light-headedness, numbness and headaches.   Hematological:  Does not bruise/bleed easily.   Psychiatric/Behavioral:  Negative for behavioral problems, hallucinations and suicidal ideas.           Objective     Last Recorded Vitals  /75 (BP Location: Left arm, Patient Position: Lying) Comment: told nurse  Pulse 65   Temp 36.8 °C (98.2 °F) (Temporal)   Resp 16   Wt 76.7 kg (169 lb)   SpO2 95%     Image Results  Electrocardiogram, 12-lead PRN ACS symptoms    Result Date: 5/3/2024  Sinus rhythm Ventricular premature complex    Transthoracic Echo (TTE) Complete    Result Date: 5/2/2024              [place] [address]      Phone [phone] Fax [phone] TRANSTHORACIC ECHOCARDIOGRAM REPORT  Patient Name:      DESTINI Agustin Physician:    [id number] Kennedy Freeman DO Study Date:        5/2/2024            Ordering Provider:    [id number] KENNEDY FREEMAN MRN/PID:           [id number]            Fellow: Accession#:        [id number]        Nurse: Date of Birth/Age: 1940 / 83      Sonographer:          Karyn parry RDCS Gender:            F                   Additional Staff: Height:            165.10 cm           Admit Date:           5/2/2024 Weight:            76.66 kg            Admission Status:     Inpatient - Routine BSA / BMI:         1.84 m2 / 28.12     Department Location:  [place] [address] Blood Pressure: 153 /83 mmHg Study Type:    TRANSTHORACIC ECHO (TTE) COMPLETE Diagnosis/ICD: Chest pain, unspecified-R07.9 Indication:    CP CPT Codes:     Echo Complete w Full [id number] Patient History: Pertinent History: HTN,  NSTEMI. Study Detail: The following Echo studies were performed: 2D, M-Mode, Doppler and               color flow.  PHYSICIAN INTERPRETATION: Left Ventricle: Left ventricular systolic function is normal, with an estimated ejection fraction of 60%. There are no regional wall motion abnormalities. The left ventricular cavity size is normal. There is mild concentric left ventricular hypertrophy. Left Ventricular Global Longitudinal Strain - 14.9 %. Spectral Doppler shows a normal pattern of left ventricular diastolic filling. Left Atrium: The left atrium is normal in size. Right Ventricle: The right ventricle is normal in size. There is normal right ventricular global systolic function. Right Atrium: The right atrium is normal in size. Aortic Valve: The aortic valve is trileaflet. There is minimal aortic valve cusp calcification. There is trace to mild aortic valve regurgitation. The peak instantaneous gradient of the aortic valve is 8.5 mmHg. The mean gradient of the aortic valve is 5.0 mmHg. Mitral Valve: The mitral valve is mildly thickened. There is trace to mild mitral valve regurgitation. Tricuspid Valve: The tricuspid valve is structurally normal. There is mild tricuspid regurgitation. Pulmonic Valve: The pulmonic valve is not well visualized. There is no indication of pulmonic valve regurgitation. Pericardium: There is no pericardial effusion noted. Aorta: The aortic root is normal.  CONCLUSIONS:  1. Left ventricular systolic function is normal with a 60% estimated ejection fraction. QUANTITATIVE DATA SUMMARY: 2D MEASUREMENTS:                          Normal Ranges: Ao Root d:     3.00 cm   (2.0-3.7cm) LAs:           3.60 cm   (2.7-4.0cm) IVSd:          1.20 cm   (0.6-1.1cm) LVPWd:         1.20 cm   (0.6-1.1cm) LVIDd:         4.00 cm   (3.9-5.9cm) LVIDs:         2.90 cm LV Mass Index: 89.8 g/m2 LV % FS        27.5 % LA VOLUME:                               Normal Ranges: LA Vol A4C:        51.2 ml     (22+/-6mL/m2) LA Vol A2C:        31.4 ml LA Vol BP:         43.1 ml LA Vol Index A4C:  27.8ml/m2 LA Vol Index A2C:  17.1 ml/m2 LA Vol Index BP:   23.4 ml/m2 LA Area A4C:       17.7 cm2 LA Area A2C:       12.9 cm2 LA Major Axis A4C: 5.2 cm LA Major Axis A2C: 4.5 cm LA Volume Index:   23.4 ml/m2 RA VOLUME BY A/L METHOD:                      Normal Ranges: RA Area A4C: 8.5 cm2 AORTA MEASUREMENTS:                      Normal Ranges: Ao Sinus, d: 3.00 cm (2.1-3.5cm) Ao STJ, d:   3.10 cm (1.7-3.4cm) Asc Ao, d:   3.10 cm (2.1-3.4cm) LV SYSTOLIC FUNCTION BY 2D PLANIMETRY (MOD):                                          Normal Ranges: EF-A4C View:                      56.5 % (>=55%) EF-A2C View:                      64.4 % EF-Biplane:                       61.8 % Global Longitudinal Strain (GLS): 14.9 % LV DIASTOLIC FUNCTION:                           Normal Ranges: MV Peak E:    0.56 m/s    (0.7-1.2 m/s) MV Peak A:    0.70 m/s    (0.42-0.7 m/s) E/A Ratio:    0.80        (1.0-2.2) MV e'         0.04 m/s    (>8.0) MV lateral e' 0.05 m/s MV medial e'  0.04 m/s MV A Dur:     120.00 msec E/e' Ratio:   12.47       (<8.0) MITRAL VALVE:                 Normal Ranges: MV DT: 295 msec (150-240msec) AORTIC VALVE:                                   Normal Ranges: AoV Vmax:                1.46 m/s (<=1.7m/s) AoV Peak P.5 mmHg (<20mmHg) AoV Mean P.0 mmHg (1.7-11.5mmHg) LVOT Max Nikita:            0.90 m/s (<=1.1m/s) AoV VTI:                 32.20 cm (18-25cm) LVOT VTI:                21.20 cm LVOT Diameter:           2.00 cm  (1.8-2.4cm) AoV Area, VTI:           2.07 cm2 (2.5-5.5cm2) AoV Area,Vmax:           1.94 cm2 (2.5-4.5cm2) AoV Dimensionless Index: 0.66 AORTIC INSUFFICIENCY: AI Vmax:       4.39 m/s AI Half-time:  629 msec AI Decel Rate: 204.00 cm/s2  RIGHT VENTRICLE: RV Basal 2.80 cm RV Mid   2.00 cm RV Major 5.8 cm TAPSE:   21.2 mm RV s'    0.12 m/s TRICUSPID VALVE/RVSP:                              Normal Ranges: Peak TR Velocity: 2.48 m/s RV Syst Pressure: 27.6 mmHg (< 30mmHg) IVC Diam:         1.50 cm  [id number] Kennedy Freeman DO Electronically signed on 5/2/2024 at 4:11:53 PM  ** Final **     XR chest 2 views    Result Date: 5/2/2024  Interpreted By:  Audrey Moody, STUDY: XR CHEST 2 VIEWS;  5/2/2024 8:15 am   INDICATION: Signs/Symptoms:CP.   COMPARISON: 12/10/2019   ACCESSION NUMBER(S): [id number]   ORDERING CLINICIAN: ALEXSANDRA CALVERT   FINDINGS: CARDIOMEDIASTINAL SILHOUETTE: Cardiomediastinal silhouette is normal in size and configuration.     LUNGS: Lungs are clear.   ABDOMEN: No remarkable upper abdominal findings.     BONES: No acute osseous changes.       No acute cardiopulmonary process.   MACRO: None   Signed by: Audrey Moody 5/2/2024 8:36 AM Dictation workstation:   [id number]    ECG 12 lead    Result Date: 5/2/2024  Sinus rhythm Probable anteroseptal infarct, old    OCT MACULA CIRRUS OU (BOTH EYES)    Result Date: 4/10/2024  Date of Procedure 4/10/2024. Technician Information Imaging Technician: Domonique. Start time: 2:33 PM. Stop time: 2:36 PM. Interpretation Right Eye Normal foveal contour. Findings include RPE Irregularity. Left Eye Normal foveal contour. Findings include RPE Irregularity. Interval Change Right Eye Initial. Left Eye Initial.        Lab Results  Results for orders placed or performed during the hospital encounter of 05/02/24 (from the past 24 hour(s))   Transthoracic Echo (TTE) Complete   Result Value Ref Range    LVOT diam 2.00 cm    LV Biplane EF 62 %    MV E/A ratio 0.80     MV avg E/e' ratio 12.47     Tricuspid annular plane systolic excursion 2.1 cm    AV mn grad 5.0 mmHg    LA vol index A/L 23.4 ml/m2    AV pk corinna 1.46 m/s    RV free wall pk S' 12.20 cm/s    LV GLS 14.9 %    RVSP 27.6 mmHg    LVIDd 4.00 cm    Aortic Valve Area by Continuity of VTI 2.07 cm2    Aortic Valve Area by Continuity of Peak Velocity 1.94 cm2    AV pk grad 8.5 mmHg    LV A4C EF 56.5     Electrocardiogram, 12-lead PRN ACS symptoms   Result Value Ref Range    Ventricular Rate 66 BPM    Atrial Rate 66 BPM    WI Interval 189 ms    QRS Duration 98 ms    QT Interval 417 ms    QTC Calculation(Bazett) 437 ms    P Axis 62 degrees    R Axis 40 degrees    T Axis 19 degrees    QRS Count 11 beats    Q Onset 252 ms    T Offset 461 ms    QTC Fredericia 430 ms   CBC   Result Value Ref Range    WBC 6.4 4.4 - 11.3 x10*3/uL    nRBC 0.0 0.0 - 0.0 /100 WBCs    RBC 4.07 4.00 - 5.20 x10*6/uL    Hemoglobin 12.4 12.0 - 16.0 g/dL    Hematocrit 38.5 36.0 - 46.0 %    MCV 95 80 - 100 fL    MCH 30.5 26.0 - 34.0 pg    MCHC 32.2 32.0 - 36.0 g/dL    RDW 13.8 11.5 - 14.5 %    Platelets 195 150 - 450 x10*3/uL   Basic Metabolic Panel   Result Value Ref Range    Glucose 96 74 - 99 mg/dL    Sodium 140 136 - 145 mmol/L    Potassium 4.0 3.5 - 5.3 mmol/L    Chloride 109 (H) 98 - 107 mmol/L    Bicarbonate 24 21 - 32 mmol/L    Anion Gap 11 10 - 20 mmol/L    Urea Nitrogen 16 6 - 23 mg/dL    Creatinine 0.61 0.50 - 1.05 mg/dL    eGFR 89 >60 mL/min/1.73m*2    Calcium 9.3 8.6 - 10.3 mg/dL   Lipid Panel   Result Value Ref Range    Cholesterol 132 0 - 199 mg/dL    HDL-Cholesterol 48.7 mg/dL    Cholesterol/HDL Ratio 2.7     LDL Calculated 64 <=99 mg/dL    VLDL 20 0 - 40 mg/dL    Triglycerides 98 0 - 149 mg/dL    Non HDL Cholesterol 83 0 - 149 mg/dL        Medications  Scheduled medications:  amLODIPine, 5 mg, oral, Daily  apixaban, 2.5 mg, oral, BID  aspirin, 81 mg, oral, Daily  atorvastatin, 20 mg, oral, Daily  calcium carbonate-vitamin D3, 1 tablet, oral, Daily  famotidine, 20 mg, oral, BID  losartan, 50 mg, oral, Daily  montelukast, 10 mg, oral, Nightly  polyethylene glycol, 17 g, oral, Daily  Tc-99m tetrofosmin, 10 millicurie, intravenous, Once in imaging      Continuous medications:     PRN medications:  PRN medications: acetaminophen **OR** acetaminophen **OR** acetaminophen, albuterol, hydrALAZINE, morphine, nitroglycerin     Physical  Exam  Constitutional:       General: She is not in acute distress.     Appearance: Normal appearance.   HENT:      Head: Normocephalic and atraumatic.      Right Ear: External ear normal.      Left Ear: External ear normal.      Nose: Nose normal.      Mouth/Throat:      Mouth: Mucous membranes are moist.      Pharynx: Oropharynx is clear.   Eyes:      Extraocular Movements: Extraocular movements intact.      Conjunctiva/sclera: Conjunctivae normal.      Pupils: Pupils are equal, round, and reactive to light.   Cardiovascular:      Rate and Rhythm: Normal rate and regular rhythm.      Pulses: Normal pulses.      Heart sounds: Normal heart sounds.   Pulmonary:      Effort: Pulmonary effort is normal. No respiratory distress.      Breath sounds: Normal breath sounds. No wheezing, rhonchi or rales.   Abdominal:      General: Bowel sounds are normal.      Palpations: Abdomen is soft.      Tenderness: There is no abdominal tenderness. There is no right CVA tenderness, left CVA tenderness, guarding or rebound.   Musculoskeletal:         General: No swelling. Normal range of motion.      Cervical back: Normal range of motion and neck supple.   Skin:     General: Skin is warm and dry.      Capillary Refill: Capillary refill takes less than 2 seconds.      Findings: No lesion or rash.   Neurological:      General: No focal deficit present.      Mental Status: She is alert and oriented to person, place, and time. Mental status is at baseline.   Psychiatric:         Mood and Affect: Mood normal.         Behavior: Behavior normal.                  Code Status  Full Code     Assessment/Plan      Atypical chest pain  HS trop negative  EKG changes suggestive of ischemia  Patient reports prior history of non-STEMI but at that time her pain was radiating to the left arm and jaw, subsequent to that event she had cardiac catheterization 2019 showing mild coronary disease  She has not had any recent cardiac testing but did recently see  her cardiologist earlier this month  Monitor on tele  Echo without abnormalities  Cardiology consultation  Add ASA  Stress test- is no evidence of ischemia, she can likely be discharged     Hypertension  The patient's blood pressure is running slightly elevated  Increase losartan  Patient has history of ACE inhibitor reaction with swelling of her lips  Amlodipine has been added to her regimen      Acid reflux disease  H2 blocker  She states it has been a long time since seeing GI or having EGD- should follow up outpatient with GI for eval need for EGD given severity of symptoms per her report     Peripheral vascular disease with history of recurrent DVTs  Continue patient on Eliquis and statin     Asthma  Continue patient's home medications  Prn albuterol     Dyslipidemia  Continue patient on statin   Lipid panel as above    History of PSVT  Stable, patient denies any significant palpitations.  ECG showing sinus rhythm with a heart rate of 68 bpm.    DVT ppx:        Please see orders for more complete plan    Shani Srinivasan PA-C

## 2024-05-03 NOTE — PROGRESS NOTES
PROGRESS NOTE    HPI:  Marychuy Anderson is a 83 y.o. female who presented to the emergency department with chest discomfort.  Patient states that she had chest discomfort which started this morning and has been persistent for several hours.  She states that she tried doing some push-ups to help relieve the discomfort but it did not improve her chest pain.  She was given aspirin and her pain continued.  She therefore decided to come to the emergency department for evaluation.  BMP shows a serum sodium of 140, serum potassium of 4.1, serum creatinine 0.64, serum magnesium was 1.69, BNP was 35, troponin was 3 x 2, CBC showed a hemoglobin of 12.1.  Chest x-ray showed no acute cardiopulmonary process.  ECG showed sinus rhythm with possible anteroseptal infarct age undetermined and a heart rate of 68 bpm.  Cardiac catheterization done in December 2019 showed mild nonobstructive disease.  During my exam the patient was resting comfortably in bed.       Subjective Data:  Blood pressures have been somewhat elevated and now she notices this little bit of discomfort in her back as well.  She has not had that in the past.  Her echocardiogram yesterday is normal.  Troponins have been negative and were awaiting her stress data from this morning.  Patient exercises regularly on a daily basis and with those normal exercise movements and activity she has not had this pain before.    Overnight Events:    Elevated blood pressures     Objective Data:  Last Recorded Vitals:  Vitals:    05/03/24 0037 05/03/24 0048 05/03/24 0525 05/03/24 0900   BP: 142/83 151/79 156/79 171/75   BP Location: Left arm Left arm Left arm Left arm   Patient Position: Lying Lying Lying Lying   Pulse: 86 67 70 65   Resp:  17 16 16   Temp:  36.6 °C (97.9 °F) 36.7 °C (98.1 °F) 36.8 °C (98.2 °F)   TempSrc:  Temporal Temporal Temporal   SpO2:  94% 95% 95%   Weight:       Height:           Last Labs:  CBC - 5/3/2024:  4:20 AM  6.4 12.4 195    38.5      CMP -  5/3/2024:  4:20 AM  9.3 6.1 14 --- 0.5   _ 3.7 11 49      PTT - No results in last year.  _   _ _     Last I/O:  I/O last 3 completed shifts:  In: 480 (6.3 mL/kg) [P.O.:480]  Out: - (0 mL/kg)   Weight: 76.7 kg     Past Cardiology Tests (Last 3 Years):  Echo: CONCLUSIONS:   1. Left ventricular systolic function is normal with a 60% estimated ejection fraction.    Stress Test: Pending today        Inpatient Medications:  Scheduled medications   Medication Dose Route Frequency    amLODIPine  5 mg oral Daily    apixaban  2.5 mg oral BID    aspirin  81 mg oral Daily    atorvastatin  20 mg oral Daily    calcium carbonate-vitamin D3  1 tablet oral Daily    famotidine  20 mg oral BID    losartan  50 mg oral Daily    montelukast  10 mg oral Nightly    polyethylene glycol  17 g oral Daily    Tc-99m tetrofosmin  10 millicurie intravenous Once in imaging       Review of Systems   Constitutional: Negative for fever and malaise/fatigue.   Cardiovascular:  Positive for chest pain. Negative for orthopnea and palpitations.        Still with intermittent discomfort and seems to be more so related to position or movement   Respiratory:  Negative for shortness of breath and wheezing.    Skin:  Negative for itching and rash.   Gastrointestinal:  Negative for abdominal pain, diarrhea, nausea and vomiting.   Genitourinary:  Negative for dysuria.   Neurological:  Negative for weakness.        Physical Exam  Constitutional:       General: She is not in acute distress.     Appearance: Normal appearance.   HENT:      Mouth/Throat:      Mouth: Mucous membranes are moist.   Neck:      Comments: Flat neck veins  Cardiovascular:      Rate and Rhythm: Normal rate and regular rhythm.      Comments: I am not able to reproduce the discomfort on gentle palpation  Pulmonary:      Effort: Pulmonary effort is normal.      Breath sounds: Normal breath sounds.   Abdominal:      Palpations: Abdomen is soft.      Tenderness: There is no abdominal tenderness.    Musculoskeletal:      Right lower leg: No edema.      Left lower leg: No edema.   Skin:     General: Skin is warm and dry.   Neurological:      Mental Status: She is alert and oriented to person, place, and time.   Psychiatric:         Behavior: Behavior is cooperative.         ASSESSMENT/PLAN  1.  Chest pain/CAD  The patient has a history of mild nonobstructive coronary disease as seen on cardiac catheterization done in 2019.  She is now presenting with atypical chest discomfort which was worsened on palpation of her chest wall.  Troponins are negative x 2.  ECG showing sinus rhythm with possible anteroseptal infarct age undetermined and a heart rate of 68 beats per minutes.  Will check echocardiogram and stress study for additional evaluation.  5-3-24: Discomfort seems very atypical and more positional/musculoskeletal.  Enzymes negative echo normal and if stress test is normal no further cardiac testing is necessary.  She will need exploration of other causes of this discomfort as now it seems to be in her back a little bit as well.  Again heart catheterization in 2019 showed no significant CAD.    2.  Hypertension  Patient has a history of hypertension which appears suboptimally controlled during my examination.  Would restart home antihypertensive medical therapy and adjust as necessary.  5-3-24: Blood pressures have been elevated.  Amlodipine has been added to her regimen     3.  Dyslipidemia  Continue statin therapy.     4.  History of PSVT  Stable, patient denies any significant palpitations.  ECG showing sinus rhythm with a heart rate of 68 bpm.  5-3-24: No palpitations and telemetry has been sinus rhythm.     5.  History of multiple recurrent DVT.  Patient on apixaban for anticoagulation  5-3-24: Continue anticoagulation therapy with history of recurrent DVT.    Recommendations--symptoms very atypical for ischemia.  Patient exercises on a daily basis with no pain like this before and now the pain is  somewhat in her back and seems musculoskeletal as it is positional and comes and goes.  Enzymes were negative and if stress test shows no evidence of ischemia no further cardiac testing is necessary and cardiology will sign off.    Gareth Rodriguez PA-C  5/3/2024  10:08 AM

## 2024-05-03 NOTE — NURSING NOTE
Discharge instructions reviewed with patient. Medication list reviewed with patient. Patient understands how to followup outpatient. All questions and concerns answered at this time.   IV removed from patient.   Patient refused wheelchair. Safely ambulated off unit with  and all belongings.

## 2024-05-03 NOTE — CARE PLAN
The patient's goals for the shift include      The clinical goals for the shift include For chest pain to subside      Problem: Pain  Goal: My pain/discomfort is manageable  Outcome: Progressing     Problem: Safety  Goal: Patient will be injury free during hospitalization  Outcome: Progressing  Goal: I will remain free of falls  Outcome: Progressing     Problem: Daily Care  Goal: Daily care needs are met  Outcome: Progressing     Problem: Psychosocial Needs  Goal: Demonstrates ability to cope with hospitalization/illness  Outcome: Progressing  Goal: Collaborate with me, my family, and caregiver to identify my specific goals  Outcome: Progressing

## 2024-05-03 NOTE — PROGRESS NOTES
24 1105   Discharge Planning   Does the patient need discharge transport arranged? No     Called patients room with no answer.  Called Daughter Marialuisa Jiménez  and message left to call me.   1400 Called into patients room. Identified myself. Confirmed name, , address,phone, insurance, PCP and pharmacy. Lives with her 90 year old , who is in good health. She is independent with all aspects of care. Very active in her community.  Occasionally uses a cane at night. Able to obtain, understand and afford her medications.  No home going needs.

## 2024-05-03 NOTE — CARE PLAN
The patient's goals for the shift include to have a decrease in chest pain.      The clinical goals for the shift include decrease in chest pain, patient to have stress test, remain safe and free from falls and injury during shift.

## 2024-05-03 NOTE — PROGRESS NOTES
Social work consult placed for positive medical risk screen. SW reviewed pt's chart and communicated with T[facility]. No SW needs foreseen at this time. SW signing off; available upon request.    SHALINI Sol, LSW ([id number])   Care Transitions

## 2024-05-04 LAB
ATRIAL RATE: 66 BPM
ATRIAL RATE: 66 BPM
P AXIS: 45 DEGREES
P AXIS: 62 DEGREES
PR INTERVAL: 187 MS
PR INTERVAL: 189 MS
Q ONSET: 252 MS
Q ONSET: 253 MS
QRS COUNT: 11 BEATS
QRS COUNT: 11 BEATS
QRS DURATION: 92 MS
QRS DURATION: 98 MS
QT INTERVAL: 392 MS
QT INTERVAL: 417 MS
QTC CALCULATION(BAZETT): 417 MS
QTC CALCULATION(BAZETT): 437 MS
QTC FREDERICIA: 408 MS
QTC FREDERICIA: 430 MS
R AXIS: 26 DEGREES
R AXIS: 40 DEGREES
T AXIS: 15 DEGREES
T AXIS: 19 DEGREES
T OFFSET: 449 MS
T OFFSET: 461 MS
VENTRICULAR RATE: 66 BPM
VENTRICULAR RATE: 68 BPM

## 2024-05-07 NOTE — DISCHARGE SUMMARY
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            Hospital Course   Marychuy Anderson is a 83 y.o. female with PMHx s/f hypertension dyslipidemia multiple DVTs remote history of non-STEMI presenting with pain.  Patient had episode of midsternal chest discomfort after waking up as she did some push-ups she felt that the pain and discomfort improved.  The pain she has does not radiate it is in the center of the chest.  No associated shortness of breath no nausea or diaphoresis.  Today the patient woke up and had worse chest pain she tried her push-ups and the pain did not improve she took multiple aspirin and called EMS EMS initially gave her nitroglycerin which did improve the discomfort but bottomed out her blood pressure.  The patient was still having some chest discomfort on arrival to the emergency department was given 324 mg of aspirin 2 sets of troponins were negative.  EKG did not demonstrate any ischemic changes.  Patient was given fentanyl with improvement in her pain.  Patient denies fevers chills admits to some cough occasional wheezing relates a history of asthma for that.  Echo: EF 60%, no WMA. Lipid panel WNL     5/3/2024: No acute events overnight. Vitals stable, BP slightly accelerated. Labs largely unremarkable. Symptoms seem very atypical for ischemia, suspect more-so MSK but will await stress test.     Stress test: \"SPECT stress myocardial perfusion imaging in response to  pharmacologic stress demonstrate a intermediate sized, moderately  severe, fixed perfusion defect in the basal to mid inferior wall. In  addition there is intermediate sized, moderately severe, partially  reversible " "perfusion defect in the mid to distal anterolateral wall.  Both of these perfusion defects are resolved on prone stress imaging,  favoring artifactual finding. There is low probability of ischemia\"    Consultations: Cardiology consulted- treatment options were discussed and plan of care agreed upon.    Pertinent Physical Exam At Time of Discharge  Constitutional:       General: She is not in acute distress.     Appearance: Normal appearance.   HENT:      Head: Normocephalic and atraumatic.      Right Ear: External ear normal.      Left Ear: External ear normal.      Nose: Nose normal.      Mouth/Throat:      Mouth: Mucous membranes are moist.      Pharynx: Oropharynx is clear.   Eyes:      Extraocular Movements: Extraocular movements intact.      Conjunctiva/sclera: Conjunctivae normal.      Pupils: Pupils are equal, round, and reactive to light.   Cardiovascular:      Rate and Rhythm: Normal rate and regular rhythm.      Pulses: Normal pulses.      Heart sounds: Normal heart sounds.   Pulmonary:      Effort: Pulmonary effort is normal. No respiratory distress.      Breath sounds: Normal breath sounds. No wheezing, rhonchi or rales.   Abdominal:      General: Bowel sounds are normal.      Palpations: Abdomen is soft.      Tenderness: There is no abdominal tenderness. There is no right CVA tenderness, left CVA tenderness, guarding or rebound.   Musculoskeletal:         General: No swelling. Normal range of motion.      Cervical back: Normal range of motion and neck supple.   Skin:     General: Skin is warm and dry.      Capillary Refill: Capillary refill takes less than 2 seconds.      Findings: No lesion or rash.   Neurological:      General: No focal deficit present.      Mental Status: She is alert and oriented to person, place, and time. Mental status is at baseline.   Psychiatric:         Mood and Affect: Mood normal.         Behavior: Behavior normal.     Code Status  Full Code     Home Medications   "   Medication List      START taking these medications     amLODIPine 5 mg tablet; Commonly known as: Norvasc; Take 1 tablet (5 mg)   by mouth once daily.     CONTINUE taking these medications     Asmanex  mcg/actuation HFA aerosol inhaler; Generic drug:   mometasone   atorvastatin 20 mg tablet; Commonly known as: Lipitor   biotin 5,000 mcg tablet,disintegrating   Caltrate with Vitamin D3 600 mg-20 mcg (800 unit) tablet; Generic drug:   calcium carbonate-vitamin D3   cholecalciferol 25 MCG (1000 UT) tablet; Commonly known as: Vitamin D-3   cyanocobalamin 1,000 mcg tablet; Commonly known as: Vitamin B-12   Eliquis 2.5 mg tablet; Generic drug: apixaban   hydrocortisone 2.5 % cream   losartan 25 mg tablet; Commonly known as: Cozaar   montelukast 10 mg tablet; Commonly known as: Singulair   MULTI VITAMIN ORAL   multivitamin with minerals tablet   triamcinolone 0.1 % ointment; Commonly known as: Kenalog   zolpidem 10 mg tablet; Commonly known as: Ambien       Outpatient Follow-Up  No future appointments.      At the time of discharge, patient's pain was controlled with oral analgesia, patient was urinating, having BMs, sleeping, and eating well. Follow up recommendations are in discharge paperwork. Discharge plan was discussed with the patient/family and all of the questions were answered. Medications were ordered to be delivered to bedside prior to discharge.     Discharge planning took greater than 35 minutes    Diagnoses at time of discharge:  Atypical chest pain  Hypertension  Acid reflux  Peripheral vascular disease with history of recurrent DVTs on Eliquis  Asthma  Dyslipidemia  History of PSVT    Anticipated discharge destination: home    Please see orders for more complete plan    Shani Srinivasan PA-C

## 2024-12-07 ENCOUNTER — HOSPITAL ENCOUNTER (EMERGENCY)
Facility: HOSPITAL | Age: 84
Discharge: HOME | End: 2024-12-07
Attending: STUDENT IN AN ORGANIZED HEALTH CARE EDUCATION/TRAINING PROGRAM
Payer: MEDICARE

## 2024-12-07 ENCOUNTER — APPOINTMENT (OUTPATIENT)
Dept: RADIOLOGY | Facility: HOSPITAL | Age: 84
End: 2024-12-07
Payer: MEDICARE

## 2024-12-07 ENCOUNTER — APPOINTMENT (OUTPATIENT)
Dept: CARDIOLOGY | Facility: HOSPITAL | Age: 84
End: 2024-12-07
Payer: MEDICARE

## 2024-12-07 VITALS
SYSTOLIC BLOOD PRESSURE: 156 MMHG | WEIGHT: 167 LBS | TEMPERATURE: 98.6 F | BODY MASS INDEX: 27.82 KG/M2 | OXYGEN SATURATION: 98 % | DIASTOLIC BLOOD PRESSURE: 67 MMHG | HEART RATE: 62 BPM | HEIGHT: 65 IN | RESPIRATION RATE: 20 BRPM
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
## 2024-12-07 LAB
ALBUMIN SERPL BCP-MCNC: 4.4 G/DL (ref 3.4–5)
ALP SERPL-CCNC: 55 U/L (ref 33–136)
ALT SERPL W P-5'-P-CCNC: 13 U/L (ref 7–45)
ANION GAP SERPL CALC-SCNC: 11 MMOL/L (ref 10–20)
AST SERPL W P-5'-P-CCNC: 16 U/L (ref 9–39)
BASOPHILS # BLD AUTO: 0.08 X10*3/UL (ref 0–0.1)
BASOPHILS NFR BLD AUTO: 0.9 %
BILIRUB SERPL-MCNC: 0.6 MG/DL (ref 0–1.2)
BUN SERPL-MCNC: 22 MG/DL (ref 6–23)
CALCIUM SERPL-MCNC: 9.5 MG/DL (ref 8.6–10.3)
CARDIAC TROPONIN I PNL SERPL HS: <3 NG/L (ref 0–13)
CARDIAC TROPONIN I PNL SERPL HS: <3 NG/L (ref 0–13)
CHLORIDE SERPL-SCNC: 104 MMOL/L (ref 98–107)
CO2 SERPL-SCNC: 25 MMOL/L (ref 21–32)
CREAT SERPL-MCNC: 0.72 MG/DL (ref 0.5–1.05)
EGFRCR SERPLBLD CKD-EPI 2021: 83 ML/MIN/1.73M*2
EOSINOPHIL # BLD AUTO: 0.13 X10*3/UL (ref 0–0.4)
EOSINOPHIL NFR BLD AUTO: 1.4 %
ERYTHROCYTE [DISTWIDTH] IN BLOOD BY AUTOMATED COUNT: 13.9 % (ref 11.5–14.5)
GLUCOSE SERPL-MCNC: 97 MG/DL (ref 74–99)
HCT VFR BLD AUTO: 41.6 % (ref 36–46)
HGB BLD-MCNC: 13.7 G/DL (ref 12–16)
IMM GRANULOCYTES # BLD AUTO: 0.03 X10*3/UL (ref 0–0.5)
IMM GRANULOCYTES NFR BLD AUTO: 0.3 % (ref 0–0.9)
LYMPHOCYTES # BLD AUTO: 2.4 X10*3/UL (ref 0.8–3)
LYMPHOCYTES NFR BLD AUTO: 26.5 %
MCH RBC QN AUTO: 31.5 PG (ref 26–34)
MCHC RBC AUTO-ENTMCNC: 32.9 G/DL (ref 32–36)
MCV RBC AUTO: 96 FL (ref 80–100)
MONOCYTES # BLD AUTO: 0.53 X10*3/UL (ref 0.05–0.8)
MONOCYTES NFR BLD AUTO: 5.8 %
NEUTROPHILS # BLD AUTO: 5.89 X10*3/UL (ref 1.6–5.5)
NEUTROPHILS NFR BLD AUTO: 65.1 %
NRBC BLD-RTO: 0 /100 WBCS (ref 0–0)
PLATELET # BLD AUTO: 225 X10*3/UL (ref 150–450)
POTASSIUM SERPL-SCNC: 4.4 MMOL/L (ref 3.5–5.3)
PROT SERPL-MCNC: 6.9 G/DL (ref 6.4–8.2)
RBC # BLD AUTO: 4.35 X10*6/UL (ref 4–5.2)
SODIUM SERPL-SCNC: 136 MMOL/L (ref 136–145)
WBC # BLD AUTO: 9.1 X10*3/UL (ref 4.4–11.3)

## 2024-12-07 PROCEDURE — 36415 COLL VENOUS BLD VENIPUNCTURE: CPT | Performed by: STUDENT IN AN ORGANIZED HEALTH CARE EDUCATION/TRAINING PROGRAM

## 2024-12-07 PROCEDURE — 74177 CT ABD & PELVIS W/CONTRAST: CPT | Mod: FOREIGN READ | Performed by: RADIOLOGY

## 2024-12-07 PROCEDURE — 70450 CT HEAD/BRAIN W/O DYE: CPT

## 2024-12-07 PROCEDURE — 2550000001 HC RX 255 CONTRASTS: Performed by: STUDENT IN AN ORGANIZED HEALTH CARE EDUCATION/TRAINING PROGRAM

## 2024-12-07 PROCEDURE — 84484 ASSAY OF TROPONIN QUANT: CPT | Performed by: STUDENT IN AN ORGANIZED HEALTH CARE EDUCATION/TRAINING PROGRAM

## 2024-12-07 PROCEDURE — 85025 COMPLETE CBC W/AUTO DIFF WBC: CPT | Performed by: STUDENT IN AN ORGANIZED HEALTH CARE EDUCATION/TRAINING PROGRAM

## 2024-12-07 PROCEDURE — 72131 CT LUMBAR SPINE W/O DYE: CPT

## 2024-12-07 PROCEDURE — 71260 CT THORAX DX C+: CPT

## 2024-12-07 PROCEDURE — 80053 COMPREHEN METABOLIC PANEL: CPT | Performed by: STUDENT IN AN ORGANIZED HEALTH CARE EDUCATION/TRAINING PROGRAM

## 2024-12-07 PROCEDURE — 72128 CT CHEST SPINE W/O DYE: CPT | Mod: FOREIGN READ | Performed by: RADIOLOGY

## 2024-12-07 PROCEDURE — 72128 CT CHEST SPINE W/O DYE: CPT

## 2024-12-07 PROCEDURE — 99285 EMERGENCY DEPT VISIT HI MDM: CPT | Mod: 25 | Performed by: STUDENT IN AN ORGANIZED HEALTH CARE EDUCATION/TRAINING PROGRAM

## 2024-12-07 PROCEDURE — 73562 X-RAY EXAM OF KNEE 3: CPT | Mod: RIGHT SIDE | Performed by: RADIOLOGY

## 2024-12-07 PROCEDURE — 74177 CT ABD & PELVIS W/CONTRAST: CPT

## 2024-12-07 PROCEDURE — 93005 ELECTROCARDIOGRAM TRACING: CPT

## 2024-12-07 PROCEDURE — 73502 X-RAY EXAM HIP UNI 2-3 VIEWS: CPT | Mod: RT

## 2024-12-07 PROCEDURE — 71260 CT THORAX DX C+: CPT | Mod: FOREIGN READ | Performed by: RADIOLOGY

## 2024-12-07 PROCEDURE — 99285 EMERGENCY DEPT VISIT HI MDM: CPT | Mod: 25

## 2024-12-07 PROCEDURE — 70450 CT HEAD/BRAIN W/O DYE: CPT | Performed by: RADIOLOGY

## 2024-12-07 PROCEDURE — 73502 X-RAY EXAM HIP UNI 2-3 VIEWS: CPT | Mod: RIGHT SIDE | Performed by: RADIOLOGY

## 2024-12-07 PROCEDURE — 2500000005 HC RX 250 GENERAL PHARMACY W/O HCPCS: Performed by: STUDENT IN AN ORGANIZED HEALTH CARE EDUCATION/TRAINING PROGRAM

## 2024-12-07 PROCEDURE — 73562 X-RAY EXAM OF KNEE 3: CPT | Mod: RT

## 2024-12-07 PROCEDURE — 72125 CT NECK SPINE W/O DYE: CPT | Performed by: RADIOLOGY

## 2024-12-07 PROCEDURE — 72125 CT NECK SPINE W/O DYE: CPT

## 2024-12-07 PROCEDURE — 72131 CT LUMBAR SPINE W/O DYE: CPT | Mod: FOREIGN READ | Performed by: RADIOLOGY

## 2024-12-07 PROCEDURE — 2500000001 HC RX 250 WO HCPCS SELF ADMINISTERED DRUGS (ALT 637 FOR MEDICARE OP): Performed by: STUDENT IN AN ORGANIZED HEALTH CARE EDUCATION/TRAINING PROGRAM

## 2024-12-07 RX ORDER — IBUPROFEN 600 MG/1
600 TABLET ORAL ONCE
Status: COMPLETED | OUTPATIENT
Start: 2024-12-07 | End: 2024-12-07

## 2024-12-07 RX ORDER — IBUPROFEN 600 MG/1
600 TABLET ORAL EVERY 6 HOURS PRN
Qty: 20 TABLET | Refills: 0 | Status: SHIPPED | OUTPATIENT
Start: 2024-12-07 | End: 2024-12-12

## 2024-12-07 RX ORDER — LIDOCAINE 560 MG/1
1 PATCH PERCUTANEOUS; TOPICAL; TRANSDERMAL ONCE
Status: DISCONTINUED | OUTPATIENT
Start: 2024-12-07 | End: 2024-12-07 | Stop reason: HOSPADM

## 2024-12-07 RX ADMIN — LIDOCAINE 4% 1 PATCH: 40 PATCH TOPICAL at 15:48

## 2024-12-07 RX ADMIN — IOHEXOL 100 ML: 350 INJECTION, SOLUTION INTRAVENOUS at 13:17

## 2024-12-07 RX ADMIN — IBUPROFEN 600 MG: 600 TABLET, FILM COATED ORAL at 15:48

## 2024-12-07 ASSESSMENT — PAIN SCALES - GENERAL
PAINLEVEL_OUTOF10: 0 - NO PAIN
PAINLEVEL_OUTOF10: 3
PAINLEVEL_OUTOF10: 6
PAINLEVEL_OUTOF10: 3

## 2024-12-07 ASSESSMENT — PAIN - FUNCTIONAL ASSESSMENT
PAIN_FUNCTIONAL_ASSESSMENT: 0-10

## 2024-12-07 ASSESSMENT — COLUMBIA-SUICIDE SEVERITY RATING SCALE - C-SSRS
2. HAVE YOU ACTUALLY HAD ANY THOUGHTS OF KILLING YOURSELF?: NO
1. IN THE PAST MONTH, HAVE YOU WISHED YOU WERE DEAD OR WISHED YOU COULD GO TO SLEEP AND NOT WAKE UP?: NO
6. HAVE YOU EVER DONE ANYTHING, STARTED TO DO ANYTHING, OR PREPARED TO DO ANYTHING TO END YOUR LIFE?: NO

## 2024-12-07 ASSESSMENT — LIFESTYLE VARIABLES
HAVE YOU EVER FELT YOU SHOULD CUT DOWN ON YOUR DRINKING: NO
HAVE PEOPLE ANNOYED YOU BY CRITICIZING YOUR DRINKING: NO
EVER FELT BAD OR GUILTY ABOUT YOUR DRINKING: NO
TOTAL SCORE: 0
EVER HAD A DRINK FIRST THING IN THE MORNING TO STEADY YOUR NERVES TO GET RID OF A HANGOVER: NO

## 2024-12-07 NOTE — ED PROVIDER NOTES
HPI   Chief Complaint   Patient presents with    HIA/ fell monday hit head cement floor/ on eliquis/ right h       HPI  83 yo F on eliquis presents after a fall on Monday. She reports a mechanical fall after she tripped and fell backwards, striking her head on a cement floor. She states she has been able to ambulate since that time, but has had headache, dizziness, pain in her right shoulder and right knee. She denies LOC. She denies neck or back pain, changes in vision, syncope, chest pain, palpitations, SOB, abdominal pain, N/v/D, numbness, tingling, weakness, change in medications, missed doses of medications.      Patient History   No past medical history on file.  No past surgical history on file.  No family history on file.  Social History     Tobacco Use    Smoking status: Never    Smokeless tobacco: Never   Vaping Use    Vaping status: Never Used   Substance Use Topics    Alcohol use: Yes     Alcohol/week: 3.0 standard drinks of alcohol     Types: 3 Glasses of wine per week    Drug use: Never       Physical Exam   ED Triage Vitals   Temperature Heart Rate Respirations BP   12/07/24 1230 12/07/24 1230 12/07/24 1230 12/07/24 1230   36.9 °C (98.4 °F) 82 18 129/80      Pulse Ox Temp Source Heart Rate Source Patient Position   12/07/24 1230 12/07/24 1230 12/07/24 1230 12/07/24 1230   98 % Temporal Monitor Sitting      BP Location FiO2 (%)     12/07/24 1230 12/07/24 1246     Left arm 21 %       Physical Exam  Vitals reviewed.   HENT:      Head: Normocephalic and atraumatic.      Comments: No salazar sign, no racoon eyes     Right Ear: Tympanic membrane normal.      Left Ear: Tympanic membrane normal.      Ears:      Comments: No hemotympanum     Mouth/Throat:      Pharynx: Oropharynx is clear.   Eyes:      Pupils: Pupils are equal, round, and reactive to light.   Cardiovascular:      Rate and Rhythm: Normal rate and regular rhythm.   Pulmonary:      Effort: Pulmonary effort is normal.      Breath sounds: Normal  breath sounds. No stridor. No wheezing, rhonchi or rales.   Abdominal:      Palpations: Abdomen is soft.      Tenderness: There is no abdominal tenderness. There is no guarding or rebound.   Musculoskeletal:         General: Normal range of motion.      Cervical back: No tenderness (No midline CTLS tenderness, no step offs or deformities.).   Skin:     General: Skin is warm and dry.      Findings: No bruising.   Neurological:      General: No focal deficit present.      Mental Status: She is alert and oriented to person, place, and time.      Sensory: No sensory deficit.      Motor: No weakness.           ED Course & MDM   ED Course as of 01/02/25 1943   Sat Dec 07, 2024   1513 CT abdomen pelvis showing some mild biliary dilatation without stone. Denying abdominal pain. Bilirubin, alk phos, ALT and AST all within normal limits on evaluation. [JG]      ED Course User Index  [JG] Cherelle Mercado MD         Diagnoses as of 01/02/25 1943   Fall, initial encounter                 No data recorded     [place] Coma Scale Score: 15 (12/07/24 1244 : Leelee Barton RN)       NIH Stroke Scale: 0 (12/07/24 1244 : Leelee Barton, RN)             EKG, interpreted by me: sinus rhythm, rate 63. Normal axis. No acute ST elevation or depression. No acute T wave abnormalities. . No evidence of acute STEMI at this time.       Medical Decision Making  83 yo F on eliquis presents after mechanical fall on Monday. Reporting pain in right shoulder and knee. No LOC. Ambulating since that time without difficulty. On exam, nontoxic appearing. No hemotympanum, no salazar sign, no racoon eyes. A&Ox4. GCS 15. No midline CTLS tenderness, no step offs or deformities. Full ROM of all extremities. Pelvis stable. CT imaging showing no acute traumatic injury. CT abdomen pelvis showing some mild biliary dilatation without stone. Denying abdominal pain. Bilirubin, alk phos, ALT and AST all within normal limits on evaluation. XR showing  no acute bony injury. Patient given analgesia with improvement in symptoms. Ambulating with a steady gait without assistance. Safe discharge plan established. Discussed follow up with PCP. Discussed strict return precautions. Patient voiced understanding and agreement with plan.    Procedure  Procedures     Cherelle Mercado MD  01/02/25 1943

## 2024-12-15 LAB
ATRIAL RATE: 63 BPM
P AXIS: 29 DEGREES
PR INTERVAL: 191 MS
Q ONSET: 249 MS
QRS COUNT: 11 BEATS
QRS DURATION: 102 MS
QT INTERVAL: 430 MS
QTC CALCULATION(BAZETT): 441 MS
QTC FREDERICIA: 437 MS
R AXIS: 11 DEGREES
T AXIS: 5 DEGREES
T OFFSET: 464 MS
VENTRICULAR RATE: 63 BPM

## 2024-12-29 ENCOUNTER — APPOINTMENT (OUTPATIENT)
Dept: RADIOLOGY | Facility: HOSPITAL | Age: 84
End: 2024-12-29
Payer: MEDICARE

## 2024-12-29 ENCOUNTER — HOSPITAL ENCOUNTER (EMERGENCY)
Facility: HOSPITAL | Age: 84
Discharge: HOME | End: 2024-12-30
Attending: EMERGENCY MEDICINE
Payer: MEDICARE

## 2024-12-29 ENCOUNTER — APPOINTMENT (OUTPATIENT)
Dept: CARDIOLOGY | Facility: HOSPITAL | Age: 84
End: 2024-12-29
Payer: MEDICARE

## 2024-12-29 DIAGNOSIS — R07.9 CHEST PAIN, UNSPECIFIED TYPE: Primary | ICD-10-CM

## 2024-12-29 LAB
ANION GAP BLDV CALCULATED.4IONS-SCNC: 13 MMOL/L (ref 10–25)
BASE EXCESS BLDV CALC-SCNC: -0.9 MMOL/L (ref -2–3)
BASOPHILS # BLD AUTO: 0.06 X10*3/UL (ref 0–0.1)
BASOPHILS NFR BLD AUTO: 0.6 %
BODY TEMPERATURE: 37 DEGREES CELSIUS
CA-I BLDV-SCNC: 1.23 MMOL/L (ref 1.1–1.33)
CHLORIDE BLDV-SCNC: 104 MMOL/L (ref 98–107)
EOSINOPHIL # BLD AUTO: 0.16 X10*3/UL (ref 0–0.4)
EOSINOPHIL NFR BLD AUTO: 1.7 %
ERYTHROCYTE [DISTWIDTH] IN BLOOD BY AUTOMATED COUNT: 14 % (ref 11.5–14.5)
GLUCOSE BLDV-MCNC: 102 MG/DL (ref 74–99)
HCO3 BLDV-SCNC: 23.5 MMOL/L (ref 22–26)
HCT VFR BLD AUTO: 38 % (ref 36–46)
HCT VFR BLD EST: 39 % (ref 36–46)
HGB BLD-MCNC: 12.5 G/DL (ref 12–16)
HGB BLDV-MCNC: 12.9 G/DL (ref 12–16)
IMM GRANULOCYTES # BLD AUTO: 0.04 X10*3/UL (ref 0–0.5)
IMM GRANULOCYTES NFR BLD AUTO: 0.4 % (ref 0–0.9)
INHALED O2 CONCENTRATION: 0 %
LACTATE BLDV-SCNC: 1.1 MMOL/L (ref 0.4–2)
LYMPHOCYTES # BLD AUTO: 2.59 X10*3/UL (ref 0.8–3)
LYMPHOCYTES NFR BLD AUTO: 27.4 %
MCH RBC QN AUTO: 31.6 PG (ref 26–34)
MCHC RBC AUTO-ENTMCNC: 32.9 G/DL (ref 32–36)
MCV RBC AUTO: 96 FL (ref 80–100)
MONOCYTES # BLD AUTO: 0.76 X10*3/UL (ref 0.05–0.8)
MONOCYTES NFR BLD AUTO: 8 %
NEUTROPHILS # BLD AUTO: 5.85 X10*3/UL (ref 1.6–5.5)
NEUTROPHILS NFR BLD AUTO: 61.9 %
NRBC BLD-RTO: 0 /100 WBCS (ref 0–0)
OXYHGB MFR BLDV: 91.1 % (ref 45–75)
PCO2 BLDV: 37 MM HG (ref 41–51)
PH BLDV: 7.41 PH (ref 7.33–7.43)
PLATELET # BLD AUTO: 206 X10*3/UL (ref 150–450)
PO2 BLDV: 64 MM HG (ref 35–45)
POTASSIUM BLDV-SCNC: 3.9 MMOL/L (ref 3.5–5.3)
RBC # BLD AUTO: 3.96 X10*6/UL (ref 4–5.2)
SAO2 % BLDV: 93 % (ref 45–75)
SODIUM BLDV-SCNC: 137 MMOL/L (ref 136–145)
WBC # BLD AUTO: 9.5 X10*3/UL (ref 4.4–11.3)

## 2024-12-29 PROCEDURE — 85025 COMPLETE CBC W/AUTO DIFF WBC: CPT | Performed by: EMERGENCY MEDICINE

## 2024-12-29 PROCEDURE — 93005 ELECTROCARDIOGRAM TRACING: CPT

## 2024-12-29 PROCEDURE — 36415 COLL VENOUS BLD VENIPUNCTURE: CPT | Performed by: EMERGENCY MEDICINE

## 2024-12-29 PROCEDURE — 99285 EMERGENCY DEPT VISIT HI MDM: CPT | Performed by: EMERGENCY MEDICINE

## 2024-12-29 PROCEDURE — 84132 ASSAY OF SERUM POTASSIUM: CPT | Performed by: EMERGENCY MEDICINE

## 2024-12-29 PROCEDURE — 83880 ASSAY OF NATRIURETIC PEPTIDE: CPT | Performed by: EMERGENCY MEDICINE

## 2024-12-29 PROCEDURE — 71045 X-RAY EXAM CHEST 1 VIEW: CPT | Performed by: RADIOLOGY

## 2024-12-29 PROCEDURE — 84484 ASSAY OF TROPONIN QUANT: CPT | Performed by: EMERGENCY MEDICINE

## 2024-12-29 PROCEDURE — 83735 ASSAY OF MAGNESIUM: CPT | Performed by: EMERGENCY MEDICINE

## 2024-12-29 PROCEDURE — 71045 X-RAY EXAM CHEST 1 VIEW: CPT

## 2024-12-29 ASSESSMENT — LIFESTYLE VARIABLES
TOTAL SCORE: 0
HAVE PEOPLE ANNOYED YOU BY CRITICIZING YOUR DRINKING: NO
HAVE YOU EVER FELT YOU SHOULD CUT DOWN ON YOUR DRINKING: NO
EVER HAD A DRINK FIRST THING IN THE MORNING TO STEADY YOUR NERVES TO GET RID OF A HANGOVER: NO
EVER FELT BAD OR GUILTY ABOUT YOUR DRINKING: NO

## 2024-12-29 ASSESSMENT — PAIN - FUNCTIONAL ASSESSMENT: PAIN_FUNCTIONAL_ASSESSMENT: 0-10

## 2024-12-29 ASSESSMENT — PAIN SCALES - GENERAL: PAINLEVEL_OUTOF10: 4

## 2024-12-30 ENCOUNTER — APPOINTMENT (OUTPATIENT)
Dept: RADIOLOGY | Facility: HOSPITAL | Age: 84
End: 2024-12-30
Payer: MEDICARE

## 2024-12-30 ENCOUNTER — APPOINTMENT (OUTPATIENT)
Dept: CARDIOLOGY | Facility: HOSPITAL | Age: 84
End: 2024-12-30
Payer: MEDICARE

## 2024-12-30 VITALS
OXYGEN SATURATION: 95 % | RESPIRATION RATE: 18 BRPM | BODY MASS INDEX: 28.16 KG/M2 | TEMPERATURE: 96.8 F | WEIGHT: 169 LBS | HEIGHT: 65 IN | DIASTOLIC BLOOD PRESSURE: 74 MMHG | SYSTOLIC BLOOD PRESSURE: 141 MMHG | HEART RATE: 70 BPM

## 2024-12-30 LAB
ANION GAP SERPL CALC-SCNC: 12 MMOL/L (ref 10–20)
ATRIAL RATE: 57 BPM
ATRIAL RATE: 58 BPM
BNP SERPL-MCNC: 52 PG/ML (ref 0–99)
BUN SERPL-MCNC: 19 MG/DL (ref 6–23)
CALCIUM SERPL-MCNC: 9.2 MG/DL (ref 8.6–10.3)
CARDIAC TROPONIN I PNL SERPL HS: 3 NG/L (ref 0–13)
CARDIAC TROPONIN I PNL SERPL HS: 3 NG/L (ref 0–13)
CHLORIDE SERPL-SCNC: 109 MMOL/L (ref 98–107)
CO2 SERPL-SCNC: 22 MMOL/L (ref 21–32)
CREAT SERPL-MCNC: 0.62 MG/DL (ref 0.5–1.05)
EGFRCR SERPLBLD CKD-EPI 2021: 88 ML/MIN/1.73M*2
GLUCOSE SERPL-MCNC: 107 MG/DL (ref 74–99)
MAGNESIUM SERPL-MCNC: 1.82 MG/DL (ref 1.6–2.4)
P AXIS: 37 DEGREES
P AXIS: 52 DEGREES
POTASSIUM SERPL-SCNC: 3.8 MMOL/L (ref 3.5–5.3)
PR INTERVAL: 177 MS
PR INTERVAL: 183 MS
Q ONSET: 249 MS
Q ONSET: 251 MS
QRS COUNT: 10 BEATS
QRS COUNT: 9 BEATS
QRS DURATION: 112 MS
QRS DURATION: 99 MS
QT INTERVAL: 413 MS
QT INTERVAL: 439 MS
QTC CALCULATION(BAZETT): 413 MS
QTC CALCULATION(BAZETT): 432 MS
QTC FREDERICIA: 413 MS
QTC FREDERICIA: 433 MS
R AXIS: 11 DEGREES
R AXIS: 16 DEGREES
SODIUM SERPL-SCNC: 139 MMOL/L (ref 136–145)
T AXIS: 4 DEGREES
T AXIS: 8 DEGREES
T OFFSET: 457 MS
T OFFSET: 469 MS
VENTRICULAR RATE: 58 BPM
VENTRICULAR RATE: 60 BPM

## 2024-12-30 PROCEDURE — 93005 ELECTROCARDIOGRAM TRACING: CPT

## 2024-12-30 PROCEDURE — 71275 CT ANGIOGRAPHY CHEST: CPT | Performed by: STUDENT IN AN ORGANIZED HEALTH CARE EDUCATION/TRAINING PROGRAM

## 2024-12-30 PROCEDURE — 36415 COLL VENOUS BLD VENIPUNCTURE: CPT | Performed by: EMERGENCY MEDICINE

## 2024-12-30 PROCEDURE — 2550000001 HC RX 255 CONTRASTS: Performed by: EMERGENCY MEDICINE

## 2024-12-30 PROCEDURE — 71275 CT ANGIOGRAPHY CHEST: CPT

## 2024-12-30 PROCEDURE — 96374 THER/PROPH/DIAG INJ IV PUSH: CPT | Mod: 59

## 2024-12-30 PROCEDURE — 2500000004 HC RX 250 GENERAL PHARMACY W/ HCPCS (ALT 636 FOR OP/ED): Performed by: EMERGENCY MEDICINE

## 2024-12-30 PROCEDURE — 84484 ASSAY OF TROPONIN QUANT: CPT | Performed by: EMERGENCY MEDICINE

## 2024-12-30 RX ORDER — PANTOPRAZOLE SODIUM 40 MG/10ML
40 INJECTION, POWDER, LYOPHILIZED, FOR SOLUTION INTRAVENOUS ONCE
Status: COMPLETED | OUTPATIENT
Start: 2024-12-30 | End: 2024-12-30

## 2024-12-30 RX ADMIN — PANTOPRAZOLE SODIUM 40 MG: 40 INJECTION, POWDER, FOR SOLUTION INTRAVENOUS at 02:27

## 2024-12-30 RX ADMIN — IOHEXOL 75 ML: 350 INJECTION, SOLUTION INTRAVENOUS at 00:27

## 2024-12-30 NOTE — ED TRIAGE NOTES
Pt. Arrived via ems after complaint of chest pain that started later this afternoon that. Patient took 3 asa @1730. Pain didn't go away so she took 3 more asa around 2130. Pt did not take nitroglycerin r/t sensitivity. Pt. Did have a heart attack previously about 20 years ago and a hx of DVTs. On eliquis. Chest pain currently 4/10.

## 2024-12-30 NOTE — ED PROVIDER NOTES
HPI   Chief Complaint   Patient presents with    Chest Pain       HPI:  84-year-old female with history of hypertension and prior coronary artery disease presents emergency department with complaint of chest pain.  Symptoms started around 530 this afternoon with left-sided achy chest pressure that radiated into her left arm she says she took aspirin around 530 and then again around 930 along with some Tums and antiindigestion medications pain is seems to have improved but is still 4 out of 10 in severity she denies any associated nausea no vomiting no lightheadedness or dizziness no diarrhea no shortness of breath    Patient said she had a mild heart attack about 20 years ago was not amenable to coronary stenting at that time  She is on Eliquis daily for history of recurrent DVTs    Limitations to history: None  Independent Historians: EMS report  External Records Reviewed: EMR records  ------------------------------------------------------------------------------------------------------------------------------------------  ROS: a ten point review of systems was performed and negative except as per HPI.  ------------------------------------------------------------------------------------------------------------------------------------------  PMH / PSH: as per HPI, reviewed in EMR and discussed with the patient []  MEDS:  reviewed in EMR and discussed with the patient []  ALLERGIES: reviewed in EMR[]  SocH:  as per HPI, otherwise reviewed in EMR []  FH:  as per HPI, otherwise reviewed in EMR []  ------------------------------------------------------------------------------------------------------------------------------------------  Physical Exam:  VS: As documented in the triage note and EMR flowsheet from this visit was reviewed  General: Well appearing. No acute distress.   Eyes:  Extraocular movements grossly intact. No scleral icterus.   HEENT: Atraumatic. Normocephalic.    Neck: Supple. No gross masses  CV:  RRR, audible S1/S2, 2+ symmetric peripheral pulses  Resp: Clear to auscultation bilaterally. No respiratory distress.  Non-labored respirations  GI: Soft, non-tender, non-distended, no rebound or gaurding  MSK: Symmetric muscle bulk. No gross step offs or deformities.  Skin: Warm, dry, no obvious rash.  Neuro: Speech fluent. Awake. Alert. Appropriate conversation.  Psych: Appropriate mood and affect for situation  ------------------------------------------------------------------------------------------------------------------------------------------  Hospital Course / Medical Decision Making:  Independent Interpretations:  EKG -   Twelve-lead EKG performed and independently interpreted by me shows sinus rhythm at a ventricular rate of 58 ST segments are flat without any elevation axis upright and normal QRS duration of 112 T waves are upright no signs of acute ischemia or arrhythmia    84-year-old female presents the emergency department with chest pain since this afternoon time.  She took some Tums which seems to help alleviate her symptoms.  She was hooked up to the cardiac monitor peripheral IV access obtained labs are drawn.  Troponins were negative x 2 ED workup was reassuring electrolytes were within normal limits.  She was hemodynamically stable while here in the emergency department she did get Protonix and on reevaluation had improvement in her pain.  She has had a stress test within the last 2 years as well as a recent echocardiogram and testing as ordered by her cardiologist Dr. Coe at [facility].  She is currently asymptomatic and feels comfortable and well enough for hospital discharge with close outpatient follow-up with Dr. Coe she agreed to call today for close outpatient appointment regarding her chest pain.  Patient discharged from the ED in stable condition in the care of her     Was advised of concerning signs and symptoms for which she will need to return to the ED  immediately to the emergency department for or call 911    Patient care discussed with: [Admitting team, consultants, social work, THRIVE, SANE, radiology]  Social Determinants affecting care: [Problems affording transportation, inability to afford prescriptions, lack of housing, lack of insurance]    Final diagnosis and disposition: [] Chest pain            Gabi Chow MD                          Patient History   History reviewed. No pertinent past medical history.  History reviewed. No pertinent surgical history.  No family history on file.  Social History     Tobacco Use    Smoking status: Never    Smokeless tobacco: Never   Vaping Use    Vaping status: Never Used   Substance Use Topics    Alcohol use: Yes     Alcohol/week: 3.0 standard drinks of alcohol     Types: 3 Glasses of wine per week    Drug use: Never       Physical Exam   ED Triage Vitals   Temp Pulse Resp BP   -- -- -- --      SpO2 Temp src Heart Rate Source Patient Position   -- -- -- --      BP Location FiO2 (%)     -- --       Physical Exam      ED Course & MDM   Diagnoses as of 12/30/24 8484   Chest pain, unspecified type                 No data recorded     [place] Coma Scale Score: 15 (12/29/24 6577 : Tk Rosenberg RN)                           Medical Decision Making      Procedure  Procedures     Gabi Chow MD  12/30/24 6752

## 2025-01-07 LAB
ATRIAL RATE: 57 BPM
ATRIAL RATE: 58 BPM
P AXIS: 37 DEGREES
P AXIS: 52 DEGREES
PR INTERVAL: 177 MS
PR INTERVAL: 183 MS
Q ONSET: 249 MS
Q ONSET: 251 MS
QRS COUNT: 10 BEATS
QRS COUNT: 9 BEATS
QRS DURATION: 112 MS
QRS DURATION: 99 MS
QT INTERVAL: 413 MS
QT INTERVAL: 439 MS
QTC CALCULATION(BAZETT): 413 MS
QTC CALCULATION(BAZETT): 432 MS
QTC FREDERICIA: 413 MS
QTC FREDERICIA: 433 MS
R AXIS: 11 DEGREES
R AXIS: 16 DEGREES
T AXIS: 4 DEGREES
T AXIS: 8 DEGREES
T OFFSET: 457 MS
T OFFSET: 469 MS
VENTRICULAR RATE: 58 BPM
VENTRICULAR RATE: 60 BPM

## 2025-04-19 ENCOUNTER — APPOINTMENT (OUTPATIENT)
Dept: RADIOLOGY | Facility: HOSPITAL | Age: 85
End: 2025-04-19
Payer: MEDICARE

## 2025-04-19 ENCOUNTER — HOSPITAL ENCOUNTER (EMERGENCY)
Facility: HOSPITAL | Age: 85
Discharge: HOME | End: 2025-04-19
Attending: STUDENT IN AN ORGANIZED HEALTH CARE EDUCATION/TRAINING PROGRAM
Payer: MEDICARE

## 2025-04-19 ENCOUNTER — APPOINTMENT (OUTPATIENT)
Dept: CARDIOLOGY | Facility: HOSPITAL | Age: 85
End: 2025-04-19
Payer: MEDICARE

## 2025-04-19 VITALS
WEIGHT: 164 LBS | SYSTOLIC BLOOD PRESSURE: 168 MMHG | TEMPERATURE: 99 F | BODY MASS INDEX: 27.32 KG/M2 | OXYGEN SATURATION: 97 % | DIASTOLIC BLOOD PRESSURE: 69 MMHG | HEIGHT: 65 IN | RESPIRATION RATE: 17 BRPM | HEART RATE: 91 BPM

## 2025-04-19 DIAGNOSIS — M25.512 ACUTE PAIN OF LEFT SHOULDER: Primary | ICD-10-CM

## 2025-04-19 PROCEDURE — 73080 X-RAY EXAM OF ELBOW: CPT | Mod: LEFT SIDE | Performed by: RADIOLOGY

## 2025-04-19 PROCEDURE — 73030 X-RAY EXAM OF SHOULDER: CPT | Mod: LT

## 2025-04-19 PROCEDURE — 73080 X-RAY EXAM OF ELBOW: CPT | Mod: LT

## 2025-04-19 PROCEDURE — 93005 ELECTROCARDIOGRAM TRACING: CPT

## 2025-04-19 PROCEDURE — 2500000004 HC RX 250 GENERAL PHARMACY W/ HCPCS (ALT 636 FOR OP/ED): Performed by: STUDENT IN AN ORGANIZED HEALTH CARE EDUCATION/TRAINING PROGRAM

## 2025-04-19 PROCEDURE — 96372 THER/PROPH/DIAG INJ SC/IM: CPT | Performed by: STUDENT IN AN ORGANIZED HEALTH CARE EDUCATION/TRAINING PROGRAM

## 2025-04-19 PROCEDURE — 73030 X-RAY EXAM OF SHOULDER: CPT | Mod: LEFT SIDE | Performed by: RADIOLOGY

## 2025-04-19 PROCEDURE — 99284 EMERGENCY DEPT VISIT MOD MDM: CPT | Performed by: STUDENT IN AN ORGANIZED HEALTH CARE EDUCATION/TRAINING PROGRAM

## 2025-04-19 RX ORDER — KETOROLAC TROMETHAMINE 30 MG/ML
15 INJECTION, SOLUTION INTRAMUSCULAR; INTRAVENOUS ONCE
Status: COMPLETED | OUTPATIENT
Start: 2025-04-19 | End: 2025-04-19

## 2025-04-19 RX ADMIN — KETOROLAC TROMETHAMINE 15 MG: 30 INJECTION, SOLUTION INTRAMUSCULAR at 15:11

## 2025-04-19 ASSESSMENT — PAIN - FUNCTIONAL ASSESSMENT: PAIN_FUNCTIONAL_ASSESSMENT: 0-10

## 2025-04-19 ASSESSMENT — COLUMBIA-SUICIDE SEVERITY RATING SCALE - C-SSRS
1. IN THE PAST MONTH, HAVE YOU WISHED YOU WERE DEAD OR WISHED YOU COULD GO TO SLEEP AND NOT WAKE UP?: NO
6. HAVE YOU EVER DONE ANYTHING, STARTED TO DO ANYTHING, OR PREPARED TO DO ANYTHING TO END YOUR LIFE?: NO
2. HAVE YOU ACTUALLY HAD ANY THOUGHTS OF KILLING YOURSELF?: NO

## 2025-04-19 ASSESSMENT — PAIN DESCRIPTION - ORIENTATION: ORIENTATION: LEFT

## 2025-04-19 ASSESSMENT — PAIN DESCRIPTION - PAIN TYPE: TYPE: ACUTE PAIN

## 2025-04-19 ASSESSMENT — PAIN DESCRIPTION - LOCATION: LOCATION: ARM

## 2025-04-19 ASSESSMENT — PAIN SCALES - GENERAL: PAINLEVEL_OUTOF10: 7

## 2025-04-19 NOTE — ED PROVIDER NOTES
HPI   Chief Complaint   Patient presents with    Arm Injury       This is a 84-year-old female with a past medical history CAD, who presents emerged part for left arm pain.  She states that she been having this pain for a month and a half.  She reports her arm will go numb when she is sleeping currently is not numb but she does have a throbbing sensation in her upper arm.  No chest pain no other issues otherwise.  She states that she does have a heart attack and she only had left arm numbness.  No issues otherwise                          [place] Coma Scale Score: 15                  Patient History   Medical History[1]  Surgical History[2]  Family History[3]  Social History[4]    Physical Exam   ED Triage Vitals [04/19/25 1428]   Temperature Heart Rate Respirations BP   37.2 °C (99 °F) 91 17 168/69      Pulse Ox Temp Source Heart Rate Source Patient Position   97 % Temporal -- Sitting      BP Location FiO2 (%)     Right arm --       Physical Exam  Constitutional:       General: She is not in acute distress.  HENT:      Head: Normocephalic and atraumatic.      Right Ear: Tympanic membrane normal.      Left Ear: Tympanic membrane normal.      Mouth/Throat:      Mouth: Mucous membranes are moist.   Eyes:      Extraocular Movements: Extraocular movements intact.      Conjunctiva/sclera: Conjunctivae normal.      Pupils: Pupils are equal, round, and reactive to light.   Cardiovascular:      Rate and Rhythm: Normal rate and regular rhythm.      Heart sounds: No murmur heard.  Pulmonary:      Effort: Pulmonary effort is normal. No respiratory distress.      Breath sounds: Normal breath sounds. No stridor. No wheezing or rales.   Abdominal:      General: Bowel sounds are normal. There is no distension.      Tenderness: There is no abdominal tenderness. There is no guarding or rebound.   Musculoskeletal:         General: Tenderness present. No swelling or deformity. Normal range of motion.      Comments: Slight TTP on left  mid to proximal humerus but full range of motion.  2+ pulses on both extremities upper   Skin:     General: Skin is warm and dry.      Coloration: Skin is not jaundiced.      Findings: No bruising or lesion.   Neurological:      General: No focal deficit present.      Mental Status: She is alert and oriented to person, place, and time. Mental status is at baseline.      Cranial Nerves: No cranial nerve deficit.      Motor: No weakness.   Psychiatric:         Mood and Affect: Mood normal.       Labs Reviewed - No data to display  No orders to display       ED Course & MDM   ED Course as of 04/19/25 1558   Sat Apr 19, 2025   1540 EKG on my read shows sinus rhythm at a rate of 76 bpm no ST changes or elevations, normal intervals. [CF]   1540 IMPRESSION:      High-riding left humeral head likely chronic rotator cuff tear.      Moderate degenerative changes.      No acute findings.       [CF]   1540 IMPRESSION:  Left olecranon soft tissue swelling.   [CF]      ED Course User Index  [CF] Laine Jaimes MD       Medical Decision Making  This is a 84-year-old female presents emergency department for pain in her left proximal humerus.  She has full range of motion her left arm is been going on for a month and a half she will intermittently have numbness no numbness now pulses are equal and close patient for an aortic pathology.  Low sufficient for arterial thrombus given she is good pulses in the right arm.  Septic joint with full range of motion at left elbow and shoulder and no redness.  X-ray shows a high-grade humeral head fracture chronic rotator cuff tears patient is aware of.  Her elbow pain is having left olecranon soft tissue swelling but she has full range of motion and no signs of infection denies any trauma.  This is also not where her pain is.  I did explain that she needs to follow-up with orthopedics this is also most likely a muscle injury.  For her intermittent numbness of her left arm it could be  coming from her neck or her shoulder otherwise she will follow-up with orthopedics and her primary care provider.  She currently having no issues at this time with numbness radial, medial ulnar intact.  EKG showed no signs of STEMI.  Patient reports previous symptoms after getting Toradol.  She verbalizes any return precautions.        Procedure  Procedures       [1] No past medical history on file.  [2] No past surgical history on file.  [3] No family history on file.  [4]   Social History  Tobacco Use    Smoking status: Never    Smokeless tobacco: Never   Vaping Use    Vaping status: Never Used   Substance Use Topics    Alcohol use: Yes     Alcohol/week: 3.0 standard drinks of alcohol     Types: 3 Glasses of wine per week    Drug use: Never        Laine Jaimes MD  04/19/25 5252

## 2025-04-19 NOTE — ED TRIAGE NOTES
Pt hd a fall a month ago, for the past 3 weeks pt has been experiencing left arm pain and intermittent numbness, has some bruising in healing stages.

## 2025-04-24 LAB
ATRIAL RATE: 77 BPM
P AXIS: 55 DEGREES
PR INTERVAL: 175 MS
Q ONSET: 249 MS
QRS COUNT: 12 BEATS
QRS DURATION: 96 MS
QT INTERVAL: 389 MS
QTC CALCULATION(BAZETT): 438 MS
QTC FREDERICIA: 420 MS
R AXIS: 40 DEGREES
T AXIS: 10 DEGREES
T OFFSET: 444 MS
VENTRICULAR RATE: 76 BPM

## 2025-07-07 ENCOUNTER — APPOINTMENT (OUTPATIENT)
Dept: RADIOLOGY | Facility: HOSPITAL | Age: 85
End: 2025-07-07
Payer: MEDICARE

## 2025-07-07 ENCOUNTER — HOSPITAL ENCOUNTER (EMERGENCY)
Facility: HOSPITAL | Age: 85
Discharge: HOME | End: 2025-07-07
Attending: STUDENT IN AN ORGANIZED HEALTH CARE EDUCATION/TRAINING PROGRAM
Payer: MEDICARE

## 2025-07-07 VITALS
SYSTOLIC BLOOD PRESSURE: 130 MMHG | TEMPERATURE: 98.6 F | OXYGEN SATURATION: 95 % | BODY MASS INDEX: 26.66 KG/M2 | HEIGHT: 65 IN | HEART RATE: 80 BPM | DIASTOLIC BLOOD PRESSURE: 83 MMHG | RESPIRATION RATE: 16 BRPM | WEIGHT: 160 LBS

## 2025-07-07 DIAGNOSIS — K11.20 PAROTITIS: Primary | ICD-10-CM

## 2025-07-07 LAB
ALBUMIN SERPL BCP-MCNC: 3.8 G/DL (ref 3.4–5)
ALP SERPL-CCNC: 44 U/L (ref 33–136)
ALT SERPL W P-5'-P-CCNC: 9 U/L (ref 7–45)
ANION GAP SERPL CALC-SCNC: 10 MMOL/L (ref 10–20)
APPEARANCE UR: CLEAR
AST SERPL W P-5'-P-CCNC: 11 U/L (ref 9–39)
BASOPHILS # BLD AUTO: 0.03 X10*3/UL (ref 0–0.1)
BASOPHILS NFR BLD AUTO: 0.3 %
BILIRUB SERPL-MCNC: 0.6 MG/DL (ref 0–1.2)
BILIRUB UR STRIP.AUTO-MCNC: NEGATIVE MG/DL
BUN SERPL-MCNC: 16 MG/DL (ref 6–23)
CALCIUM SERPL-MCNC: 9 MG/DL (ref 8.6–10.3)
CHLORIDE SERPL-SCNC: 103 MMOL/L (ref 98–107)
CO2 SERPL-SCNC: 26 MMOL/L (ref 21–32)
COLOR UR: ABNORMAL
CREAT SERPL-MCNC: 0.81 MG/DL (ref 0.5–1.05)
EGFRCR SERPLBLD CKD-EPI 2021: 72 ML/MIN/1.73M*2
EOSINOPHIL # BLD AUTO: 0.17 X10*3/UL (ref 0–0.4)
EOSINOPHIL NFR BLD AUTO: 1.7 %
ERYTHROCYTE [DISTWIDTH] IN BLOOD BY AUTOMATED COUNT: 14 % (ref 11.5–14.5)
GLUCOSE SERPL-MCNC: 122 MG/DL (ref 74–99)
GLUCOSE UR STRIP.AUTO-MCNC: NORMAL MG/DL
HCT VFR BLD AUTO: 36.3 % (ref 36–46)
HGB BLD-MCNC: 11.8 G/DL (ref 12–16)
IMM GRANULOCYTES # BLD AUTO: 0.06 X10*3/UL (ref 0–0.5)
IMM GRANULOCYTES NFR BLD AUTO: 0.6 % (ref 0–0.9)
KETONES UR STRIP.AUTO-MCNC: NEGATIVE MG/DL
LACTATE SERPL-SCNC: 0.8 MMOL/L (ref 0.4–2)
LEUKOCYTE ESTERASE UR QL STRIP.AUTO: ABNORMAL
LYMPHOCYTES # BLD AUTO: 1.59 X10*3/UL (ref 0.8–3)
LYMPHOCYTES NFR BLD AUTO: 15.7 %
MCH RBC QN AUTO: 31.3 PG (ref 26–34)
MCHC RBC AUTO-ENTMCNC: 32.5 G/DL (ref 32–36)
MCV RBC AUTO: 96 FL (ref 80–100)
MONOCYTES # BLD AUTO: 0.79 X10*3/UL (ref 0.05–0.8)
MONOCYTES NFR BLD AUTO: 7.8 %
MUCOUS THREADS #/AREA URNS AUTO: ABNORMAL /LPF
NEUTROPHILS # BLD AUTO: 7.5 X10*3/UL (ref 1.6–5.5)
NEUTROPHILS NFR BLD AUTO: 73.9 %
NITRITE UR QL STRIP.AUTO: NEGATIVE
NRBC BLD-RTO: 0 /100 WBCS (ref 0–0)
PH UR STRIP.AUTO: 6 [PH]
PLATELET # BLD AUTO: 188 X10*3/UL (ref 150–450)
POTASSIUM SERPL-SCNC: 3.7 MMOL/L (ref 3.5–5.3)
PROT SERPL-MCNC: 6.6 G/DL (ref 6.4–8.2)
PROT UR STRIP.AUTO-MCNC: NEGATIVE MG/DL
RBC # BLD AUTO: 3.77 X10*6/UL (ref 4–5.2)
RBC # UR STRIP.AUTO: ABNORMAL MG/DL
RBC #/AREA URNS AUTO: ABNORMAL /HPF
SODIUM SERPL-SCNC: 135 MMOL/L (ref 136–145)
SP GR UR STRIP.AUTO: 1.02
SQUAMOUS #/AREA URNS AUTO: ABNORMAL /HPF
UROBILINOGEN UR STRIP.AUTO-MCNC: NORMAL MG/DL
WBC # BLD AUTO: 10.1 X10*3/UL (ref 4.4–11.3)
WBC #/AREA URNS AUTO: ABNORMAL /HPF

## 2025-07-07 PROCEDURE — 70491 CT SOFT TISSUE NECK W/DYE: CPT | Performed by: RADIOLOGY

## 2025-07-07 PROCEDURE — 87086 URINE CULTURE/COLONY COUNT: CPT | Mod: PORLAB | Performed by: STUDENT IN AN ORGANIZED HEALTH CARE EDUCATION/TRAINING PROGRAM

## 2025-07-07 PROCEDURE — 96365 THER/PROPH/DIAG IV INF INIT: CPT | Mod: 59

## 2025-07-07 PROCEDURE — 83605 ASSAY OF LACTIC ACID: CPT | Performed by: STUDENT IN AN ORGANIZED HEALTH CARE EDUCATION/TRAINING PROGRAM

## 2025-07-07 PROCEDURE — 2500000004 HC RX 250 GENERAL PHARMACY W/ HCPCS (ALT 636 FOR OP/ED): Performed by: STUDENT IN AN ORGANIZED HEALTH CARE EDUCATION/TRAINING PROGRAM

## 2025-07-07 PROCEDURE — 99285 EMERGENCY DEPT VISIT HI MDM: CPT | Mod: 25 | Performed by: STUDENT IN AN ORGANIZED HEALTH CARE EDUCATION/TRAINING PROGRAM

## 2025-07-07 PROCEDURE — 70491 CT SOFT TISSUE NECK W/DYE: CPT

## 2025-07-07 PROCEDURE — 36415 COLL VENOUS BLD VENIPUNCTURE: CPT | Performed by: STUDENT IN AN ORGANIZED HEALTH CARE EDUCATION/TRAINING PROGRAM

## 2025-07-07 PROCEDURE — 81001 URINALYSIS AUTO W/SCOPE: CPT | Performed by: STUDENT IN AN ORGANIZED HEALTH CARE EDUCATION/TRAINING PROGRAM

## 2025-07-07 PROCEDURE — 85025 COMPLETE CBC W/AUTO DIFF WBC: CPT | Performed by: STUDENT IN AN ORGANIZED HEALTH CARE EDUCATION/TRAINING PROGRAM

## 2025-07-07 PROCEDURE — 96375 TX/PRO/DX INJ NEW DRUG ADDON: CPT | Mod: 59

## 2025-07-07 PROCEDURE — 80053 COMPREHEN METABOLIC PANEL: CPT | Performed by: STUDENT IN AN ORGANIZED HEALTH CARE EDUCATION/TRAINING PROGRAM

## 2025-07-07 PROCEDURE — 2550000001 HC RX 255 CONTRASTS: Performed by: STUDENT IN AN ORGANIZED HEALTH CARE EDUCATION/TRAINING PROGRAM

## 2025-07-07 RX ORDER — KETOROLAC TROMETHAMINE 30 MG/ML
15 INJECTION, SOLUTION INTRAMUSCULAR; INTRAVENOUS ONCE
Status: COMPLETED | OUTPATIENT
Start: 2025-07-07 | End: 2025-07-07

## 2025-07-07 RX ORDER — ONDANSETRON HYDROCHLORIDE 2 MG/ML
4 INJECTION, SOLUTION INTRAVENOUS ONCE
Status: COMPLETED | OUTPATIENT
Start: 2025-07-07 | End: 2025-07-07

## 2025-07-07 RX ADMIN — IOHEXOL 75 ML: 350 INJECTION, SOLUTION INTRAVENOUS at 21:44

## 2025-07-07 RX ADMIN — KETOROLAC TROMETHAMINE 15 MG: 30 INJECTION, SOLUTION INTRAMUSCULAR at 20:46

## 2025-07-07 RX ADMIN — AMPICILLIN SODIUM AND SULBACTAM SODIUM 3 G: 2; 1 INJECTION, POWDER, FOR SOLUTION INTRAMUSCULAR; INTRAVENOUS at 21:32

## 2025-07-07 RX ADMIN — ONDANSETRON 4 MG: 2 INJECTION, SOLUTION INTRAMUSCULAR; INTRAVENOUS at 22:42

## 2025-07-07 ASSESSMENT — LIFESTYLE VARIABLES
EVER HAD A DRINK FIRST THING IN THE MORNING TO STEADY YOUR NERVES TO GET RID OF A HANGOVER: NO
TOTAL SCORE: 0
EVER FELT BAD OR GUILTY ABOUT YOUR DRINKING: NO
HAVE YOU EVER FELT YOU SHOULD CUT DOWN ON YOUR DRINKING: NO
HAVE PEOPLE ANNOYED YOU BY CRITICIZING YOUR DRINKING: NO

## 2025-07-07 ASSESSMENT — PAIN SCALES - GENERAL: PAINLEVEL_OUTOF10: 3

## 2025-07-07 ASSESSMENT — PAIN - FUNCTIONAL ASSESSMENT: PAIN_FUNCTIONAL_ASSESSMENT: 0-10

## 2025-07-07 ASSESSMENT — PAIN DESCRIPTION - PAIN TYPE: TYPE: ACUTE PAIN

## 2025-07-08 LAB — HOLD SPECIMEN: NORMAL

## 2025-07-08 NOTE — DISCHARGE INSTRUCTIONS
Please follow-up with ENT.  Come back to ED for any worsening symptoms such as worsening pain, worsening swelling, trouble swallowing, trouble opening her mouth or trouble handling secretions.

## 2025-07-08 NOTE — PROGRESS NOTES
Emergency Medicine Transition of Care Note.    I received Marychuy Anderson in signout from Dr. Corado.  Please see the previous ED provider note for all HPI, PE and MDM up to the time of signout at 2100. This is in addition to the primary record.    In brief Marychuy Anderson is an 84 y.o. female presenting for   Chief Complaint   Patient presents with    Facial Swelling     Was seen at urgent care Sunday for right sided facial swelling and dx with acute parotitis and was put on antibiotics but pt states it is moving down into her neck      At the time of signout we were awaiting: CT imaging    Diagnoses as of 07/07/25 2305   Parotitis       Medical Decision Making    84-year-old female presented to ED with right sided facial swelling the setting of a recent diagnosis of parotitis.  Labs showed no acute findings.  At signout were pending CT imaging.  CT imaging again redemonstrates parotitis but no focal fluid collection or signs of abscess or deep space neck infection.  Patient's only had 2 doses of oral medications, therefore I do not believe she has failed outpatient management.  She did receive a one-time dose of Unasyn here.  Overall she appears well.  Therefore, will discharge home with referral to ENT.  Advised to come back to ED for any new or worsening symptoms  Final diagnoses:   [K11.20] Parotitis           Procedure  Procedures    Sven Encinas DO

## 2025-07-08 NOTE — ED PROVIDER NOTES
"    HPI   Chief Complaint   Patient presents with    Facial Swelling     Was seen at urgent care Sunday for right sided facial swelling and dx with acute parotitis and was put on antibiotics but pt states it is moving down into her neck        HPI: Patient is an 84-year-old female, recent diagnosis of acute right sided parotid [place], started on Augmentin, is taken 2 total doses of the medication, who is presenting to the emergency department for worsening symptoms.  Patient initially went to an urgent care yesterday because of pain and swelling to her right jaw, this had been ongoing for 3 days, she was diagnosed with a parotid [place] and started on Augmentin, she noticed redness and swelling that is worsened and spread from her right jaw and now down her right side of the neck, across midline of her trachea to the left side of the neck, and down onto her chest.  No fevers or chills, did have some nausea and 1 episode of vomiting today.  No lightheadedness or dizziness.      ROS: Complete 12 point review of systems performed, otherwise negative except as noted in the history of present illness    PMH: Reviewed, documented below in note. Pertinents in HPI  PSH: Reviewed and documented below in note. Pertinents in HPI  SH: No illicits.  Not homeless.  Fam: Reviewed, noncontributory to patients current complaint  MEDS: Reviewed and documented below in note. Pertinents in HPI  ALLERGIES: Reviewed and documented below in note.              History provided by:  Patient and medical records   used: No                          Warren Coma Scale Score: 15                  Patient History   Medical History[1]  Surgical History[2]  Family History[3]  Social History[4]    Physical Exam   Visit Vitals  /71   Pulse 96   Temp 37 °C (98.6 °F) (Temporal)   Resp 18   Ht 1.651 m (5' 5\")   Wt 72.6 kg (160 lb)   SpO2 96%   BMI 26.63 kg/m²   Smoking Status Never   BSA 1.82 m²      Physical Exam  Vitals and " nursing note reviewed.   Constitutional:       Appearance: Normal appearance.   HENT:      Head: Normocephalic and atraumatic.      Comments: Examination of the patient's face and neck demonstrates swelling to the right side of the jaw and parotid area.  The swelling and erythema extends down onto the neck, crosses midline of the trachea, to the left side, and also goes down onto the proximal chest wall anteriorly.  It is warm to the touch, it is tender to palpation, there is no evidence of intraoral trauma, no dental abscesses, mild dental caries noted  Neck:      Vascular: No carotid bruit.   Cardiovascular:      Rate and Rhythm: Normal rate and regular rhythm.      Pulses: Normal pulses.      Heart sounds: Normal heart sounds.   Pulmonary:      Effort: Pulmonary effort is normal.      Breath sounds: Normal breath sounds.   Abdominal:      General: There is no distension.      Palpations: Abdomen is soft.      Tenderness: There is no abdominal tenderness. There is no right CVA tenderness, left CVA tenderness, guarding or rebound.   Musculoskeletal:         General: No tenderness, deformity or signs of injury.      Cervical back: Normal range of motion. No rigidity.   Skin:     General: Skin is warm and dry.      Capillary Refill: Capillary refill takes less than 2 seconds.   Neurological:      General: No focal deficit present.      Mental Status: She is alert and oriented to person, place, and time.      Sensory: No sensory deficit.      Motor: No weakness.   Psychiatric:         Mood and Affect: Mood normal.         Behavior: Behavior normal.         CT soft tissue neck w IV contrast    (Results Pending)       Labs Reviewed   CBC WITH AUTO DIFFERENTIAL   COMPREHENSIVE METABOLIC PANEL   LACTATE   URINALYSIS WITH REFLEX CULTURE AND MICROSCOPIC    Narrative:     The following orders were created for panel order Urinalysis with Reflex Culture and Microscopic.  Procedure                               Abnormality          Status                     ---------                               -----------         ------                     Urinalysis with Reflex C...[[id number]]                                                 Extra Urine Gray Tube[[id number]]                                                         Please view results for these tests on the individual orders.   URINALYSIS WITH REFLEX CULTURE AND MICROSCOPIC   EXTRA URINE GRAY TUBE         ED Course & MDM            Medical Decision Making         Your medication list        ASK your doctor about these medications        Instructions Last Dose Given Next Dose Due   amLODIPine 5 mg tablet  Commonly known as: Norvasc      Take 1 tablet (5 mg) by mouth once daily.       Asmanex  mcg/actuation HFA aerosol inhaler  Generic drug: mometasone           atorvastatin 20 mg tablet  Commonly known as: Lipitor           biotin 5,000 mcg tablet,disintegrating           Caltrate with Vitamin D3 600 mg-20 mcg (800 unit) tablet  Generic drug: calcium carbonate-vitamin D3           cholecalciferol 25 mcg (1,000 units) tablet  Commonly known as: Vitamin D-3           cyanocobalamin 1,000 mcg tablet  Commonly known as: Vitamin B-12           Eliquis 2.5 mg tablet  Generic drug: apixaban           hydrocortisone 2.5 % cream           lidocaine HCL 4 % adhesive patch,medicated      Apply 1 patch topically once daily as needed (Muscular pain). Apply patch for 12 hours then remove for 12 hours       losartan 25 mg tablet  Commonly known as: Cozaar           montelukast 10 mg tablet  Commonly known as: Singulair           MULTI VITAMIN ORAL           multivitamin with minerals tablet           triamcinolone 0.1 % ointment  Commonly known as: Kenalog           zolpidem 10 mg tablet  Commonly known as: Ambien                    Procedure  Procedures     *This report was transcribed using voice recognition software.  Every effort was made to ensure accuracy; however, inadvertent computerized  transcription errors may be present.*  Javier Corado MD  07/07/25           [1] No past medical history on file.  [2] No past surgical history on file.  [3] No family history on file.  [4]   Social History  Tobacco Use    Smoking status: Never    Smokeless tobacco: Never   Vaping Use    Vaping status: Never Used   Substance Use Topics    Alcohol use: Yes     Alcohol/week: 3.0 standard drinks of alcohol     Types: 3 Glasses of wine per week    Drug use: Never      symptomatology. Patient remained stable.       *Disclaimer: This note was dictated by speech recognition. Minor errors in transcription may be present. Please call with questions.    Jerry Corado MD             Your medication list        ASK your doctor about these medications        Instructions Last Dose Given Next Dose Due   amLODIPine 5 mg tablet  Commonly known as: Norvasc      Take 1 tablet (5 mg) by mouth once daily.       Asmanex  mcg/actuation HFA aerosol inhaler  Generic drug: mometasone           atorvastatin 20 mg tablet  Commonly known as: Lipitor           biotin 5,000 mcg tablet,disintegrating           Caltrate with Vitamin D3 600 mg-20 mcg (800 unit) tablet  Generic drug: calcium carbonate-vitamin D3           cholecalciferol 25 mcg (1,000 units) tablet  Commonly known as: Vitamin D-3           cyanocobalamin 1,000 mcg tablet  Commonly known as: Vitamin B-12           Eliquis 2.5 mg tablet  Generic drug: apixaban           hydrocortisone 2.5 % cream           lidocaine HCL 4 % adhesive patch,medicated      Apply 1 patch topically once daily as needed (Muscular pain). Apply patch for 12 hours then remove for 12 hours       losartan 25 mg tablet  Commonly known as: Cozaar           montelukast 10 mg tablet  Commonly known as: Singulair           MULTI VITAMIN ORAL           multivitamin with minerals tablet           triamcinolone 0.1 % ointment  Commonly known as: Kenalog           zolpidem 10 mg tablet  Commonly known as: Ambien                    Procedure  Procedures     *This report was transcribed using voice recognition software.  Every effort was made to ensure accuracy; however, inadvertent computerized transcription errors may be present.*  Javier Corado MD  07/16/25           [1] No past medical history on file.  [2] No past surgical history on file.  [3] No family history on file.  [4]   Social History  Tobacco Use    Smoking status: Never    Smokeless tobacco: Never    Vaping Use    Vaping status: Never Used   Substance Use Topics    Alcohol use: Yes     Alcohol/week: 3.0 standard drinks of alcohol     Types: 3 Glasses of wine per week    Drug use: Never        Javier Corado MD  07/16/25 [id number]

## 2025-07-09 LAB — BACTERIA UR CULT: NORMAL

## 2025-08-05 ASSESSMENT — ENCOUNTER SYMPTOMS
ABDOMINAL PAIN: 0
NECK PAIN: 1
COUGH: 1
HEADACHES: 0
DIARRHEA: 1
VOMITING: 0
SORE THROAT: 0
RHINORRHEA: 1

## 2025-08-06 ENCOUNTER — APPOINTMENT (OUTPATIENT)
Dept: OTOLARYNGOLOGY | Facility: CLINIC | Age: 85
End: 2025-08-06
Payer: MEDICARE

## 2025-08-06 VITALS — BODY MASS INDEX: 26.66 KG/M2 | HEIGHT: 65 IN | WEIGHT: 160 LBS

## 2025-08-06 DIAGNOSIS — K11.21 ACUTE PAROTITIS: Primary | ICD-10-CM

## 2025-08-06 PROCEDURE — 99203 OFFICE O/P NEW LOW 30 MIN: CPT | Performed by: STUDENT IN AN ORGANIZED HEALTH CARE EDUCATION/TRAINING PROGRAM

## 2025-08-06 PROCEDURE — 1159F MED LIST DOCD IN RCRD: CPT | Performed by: STUDENT IN AN ORGANIZED HEALTH CARE EDUCATION/TRAINING PROGRAM

## 2025-08-06 PROCEDURE — 1036F TOBACCO NON-USER: CPT | Performed by: STUDENT IN AN ORGANIZED HEALTH CARE EDUCATION/TRAINING PROGRAM

## 2025-08-06 NOTE — PROGRESS NOTES
HPI  Marychuy Anderson is a 85 y.o. female presenting for initial evaluation of right parotitis.  The patient reports developing right parotitis 2 months ago, symptoms at that time included significant tenderness and swelling along the right parotid region / right jaw.   Had a CT neck performed in the emergency department where right parotiditis was noted with no focal fluid collection/abscess.   The patient completed a 10-day Augmentin course with resolution of her symptoms.  Denies pain/tenderness, neck lumps or bumps, dysphagia, odynophagia, fevers, chills, night sweats, otalgia, voice changes, weight loss.  Denies previous episodes of parotitis.      Medical History[1]    Surgical History[2]      Medications Ordered Prior to Encounter[3]     RX Allergies[4]     Review of Systems  A detailed 12 point ROS was performed and is negative except as noted in the intake form, HPI and/or Past Medical History        Physical Exam   CONSTITUTIONAL: Well developed, well nourished.  VOICE: Normal voice quality  RESPIRATION: Breathing comfortably, no stridor.  CV: No clubbing/cyanosis/edema in hands.  EYES: EOM Intact, sclera normal.  NEURO: Alert and oriented times 3, Cranial nerves V,VII intact and symmetric bilaterally.  HEAD AND FACE: Symmetric facial features, no masses or lesions, sinuses nontender to palpation.  SALIVARY GLANDS: Parotid and submandibular glands normal bilaterally.  EARS: Normal external ears, external auditory canals, and TMs to otoscopy  NOSE: External nose midline, anterior rhinoscopy is normal with limited visualization to the anterior aspect of the interior turbinates. No lesions noted.  + ORAL CAVITY/OROPHARYNX/LIPS: Normal mucous membranes, normal floor of mouth/tongue/OP, no masses or lesions are noted.  Clear saliva expressed from right parotid duct.   PHARYNGEAL WALLS AND NASOPHARYNX: No masses noted. Mucosa appears clean and moist  NECK/LYMPH: No LAD, no thyroid masses. Trachea palpably  midline  SKIN: Neck skin is without injury  PSYCH: Alert and oriented with appropriate mood and affect     Results/ data reviewed:   - CT neck 7/7/2025 personally reviewed notable for right parotitis with dilated parotid duct and associated edema.  No sialolithiasis noted.      Assessment  Right parotitis    Plan  85-year-old female presenting for evaluation of right parotitis, now resolved following Augmentin course.  The condition was reviewed and explained, warm compresses/gland massage and sialagogues use discussed.    RTC 3m         [1]   Past Medical History:  Diagnosis Date    Allergic rhinitis Childhood    Asthma 20 yrs    Fatigue 8 months    Fracture of nasal bones Feb. 2025    GERD (gastroesophageal reflux disease) 15 yrs    Obesity Years    Sleep difficulties 20 yrs   [2]   Past Surgical History:  Procedure Laterality Date    EYE SURGERY  2015    SINUS SURGERY  1958   [3]   Current Outpatient Medications on File Prior to Visit   Medication Sig Dispense Refill    apixaban (Eliquis) 2.5 mg tablet Take 1 tablet (2.5 mg) by mouth twice a day.      atorvastatin (Lipitor) 20 mg tablet Take 1 tablet (20 mg) by mouth once daily.      biotin 5,000 mcg tablet,disintegrating Take by mouth.      calcium carbonate-vitamin D3 (Caltrate with Vitamin D3) 600 mg-20 mcg (800 unit) tablet Take 1 tablet by mouth once daily.      cholecalciferol (Vitamin D-3) 25 MCG (1000 UT) tablet Take by mouth.      cyanocobalamin (Vitamin B-12) 1,000 mcg tablet Take 1 tablet (1,000 mcg) by mouth once daily.      hydrocortisone 2.5 % cream Apply to affected area on face twice daily as needed.      lidocaine HCL 4 % adhesive patch,medicated Apply 1 patch topically once daily as needed (Muscular pain). Apply patch for 12 hours then remove for 12 hours 7 patch 0    losartan (Cozaar) 25 mg tablet Take 1 tablet (25 mg) by mouth once daily.      mometasone (Asmanex HFA) 100 mcg/actuation HFA aerosol inhaler Inhale 1 puff once daily.       montelukast (Singulair) 10 mg tablet Take 1 tablet (10 mg) by mouth once daily at bedtime.      multivit-min/ferrous fumarate (MULTI VITAMIN ORAL) Take by mouth once daily.      multivitamin with minerals (multivitamin-iron-folic acid) tablet Take by mouth.      triamcinolone (Kenalog) 0.1 % ointment Apply to affected areas twice daily as needed, up to 21 days per month.  Avoid application to the face, underarms, groin.      zolpidem (Ambien) 10 mg tablet Take 0.5 tablets (5 mg) by mouth once daily as needed.      amLODIPine (Norvasc) 5 mg tablet Take 1 tablet (5 mg) by mouth once daily. 30 tablet 0     No current facility-administered medications on file prior to visit.   [4]   Allergies  Allergen Reactions    Adhesive Rash     aquacell    Adhesive Tape-Silicones Rash     aquacell    Carbomer Anaphylaxis, Hives, Itching, Rash and Shortness of breath    Cheese Anaphylaxis     Artificial cheese    Closes throat when taking artificial cheese with perservative    Clindamycin Anaphylaxis    Crab Anaphylaxis     Crab legs: Throat closes    Doxycycline Anaphylaxis    House Dust Wheezing and Shortness of breath     sneezing, watery eyes; wheezing    Levofloxacin Anaphylaxis    Nitrofurantoin Unknown and Other     Patient doesn't remember    Patient doesn't remember   Patient doesn't remember    Shellfish Containing Products Anaphylaxis    Ace Inhibitors Other, Unknown and Swelling     ACE inhibitors    Lips swelling.    Lips swelling.   Lips swelling.    Amoxicillin-Pot Clavulanate Diarrhea    Beta-Blockers (Beta-Adrenergic Blocking Agts) Unknown and Other    Heparin Itching, Unknown and Rash    Heparin (Bovine) Unknown     pt had severe reaction? HIT     pt had severe reaction? HIT    Nickel Itching and Rash     Nickel    Nitrofurantoin Macrocrystal Unknown    Sulfa (Sulfonamide Antibiotics) Hives    Sulfamethoxazole-Trimethoprim Itching

## 2025-09-09 ENCOUNTER — APPOINTMENT (OUTPATIENT)
Dept: OTOLARYNGOLOGY | Facility: CLINIC | Age: 85
End: 2025-09-09
Payer: MEDICARE

## 2025-11-10 ENCOUNTER — APPOINTMENT (OUTPATIENT)
Dept: OTOLARYNGOLOGY | Facility: CLINIC | Age: 85
End: 2025-11-10
Payer: MEDICARE